# Patient Record
Sex: FEMALE | Race: WHITE | Employment: FULL TIME | URBAN - METROPOLITAN AREA
[De-identification: names, ages, dates, MRNs, and addresses within clinical notes are randomized per-mention and may not be internally consistent; named-entity substitution may affect disease eponyms.]

---

## 2018-08-29 ENCOUNTER — HOSPITAL ENCOUNTER (INPATIENT)
Age: 68
LOS: 5 days | Discharge: HOME | DRG: 355 | End: 2018-09-04
Attending: EMERGENCY MEDICINE | Admitting: FAMILY MEDICINE
Payer: MEDICARE

## 2018-08-29 DIAGNOSIS — K43.0 INCISIONAL VENTRAL HERNIA W OBSTRUCTION: ICD-10-CM

## 2018-08-29 DIAGNOSIS — K56.609 SBO (SMALL BOWEL OBSTRUCTION) (HCC): ICD-10-CM

## 2018-08-29 PROCEDURE — 96361 HYDRATE IV INFUSION ADD-ON: CPT

## 2018-08-29 PROCEDURE — 99284 EMERGENCY DEPT VISIT MOD MDM: CPT

## 2018-08-29 PROCEDURE — 96374 THER/PROPH/DIAG INJ IV PUSH: CPT

## 2018-08-29 PROCEDURE — 96375 TX/PRO/DX INJ NEW DRUG ADDON: CPT

## 2018-08-29 NOTE — IP AVS SNAPSHOT
[address] [facility] 
[phone] Patient: Olivia Vo MRN: [id number] :1950 About your hospitalization You were admitted on:  2018 You last received care in the:  \[facility] [place] [facility]\"" 2 GENERAL SURGERY You were discharged on:  2018 Why you were hospitalized Your primary diagnosis was:  Not on File Your diagnoses also included:  Sbo (Small Bowel Obstruction) (Hcc) Follow-up Information Follow up With Details Comments Contact Info None   None ([phone]) Patient stated that they have no PCP Grecia Escalera MD Go on 2018 For surgical follow up appointment at 1:15PM [address] 
[phone] Your Scheduled Appointments   1:45 PM EDT Any with Grecia Escalera MD  
Critical access hospital [facility] ([address]) [address] [place] [address]  
[phone] Discharge Orders None A check nikki indicates which time of day the medication should be taken. My Medications START taking these medications Instructions Each Dose to Equal  
 Morning Noon Evening Bedtime HYDROcodone-acetaminophen 5-325 mg per tablet Commonly known as:  Bautista Nissa Your last dose was: Your next dose is: Take 1-2 Tabs by mouth every four (4) hours as needed. Max Daily Amount: 12 Tabs. 1-2 Tab CONTINUE taking these medications Instructions Each Dose to Equal  
 Morning Noon Evening Bedtime  
 aspirin 325 mg tablet Commonly known as:  ASPIRIN Your last dose was: Your next dose is: Take 325 mg by mouth daily. 325 mg  
    
   
   
   
  
 metoprolol tartrate 25 mg tablet Commonly known as:  LOPRESSOR Your last dose was: Your next dose is: Take 1 Tab by mouth every twelve (12) hours. 25 mg  
    
   
   
   
  
 pravastatin 40 mg tablet Commonly known as:  PRAVACHOL Your last dose was: Your next dose is: Take 1 Tab by mouth nightly. 40 mg  
    
   
   
   
  
 VITAMIN D2 50,000 unit capsule Generic drug:  ergocalciferol Your last dose was: Your next dose is: Take 50,000 Units by mouth daily. 18191 Units Where to Get Your Medications Information on where to get these meds will be given to you by the nurse or doctor. ! Ask your nurse or doctor about these medications HYDROcodone-acetaminophen 5-325 mg per tablet Opioid Education Prescription Opioids: What You Need to Know: 
 
Prescription opioids can be used to help relieve moderate-to-severe pain and are often prescribed following a surgery or injury, or for certain health conditions. These medications can be an important part of treatment but also come with serious risks. Opioids are strong pain medicines. Examples include hydrocodone, oxycodone, fentanyl, and morphine. Heroin is an example of an illegal opioid. It is important to work with your health care provider to make sure you are getting the safest, most effective care. WHAT ARE THE RISKS AND SIDE EFFECTS OF OPIOID USE? Prescription opioids carry serious risks of addiction and overdose, especially with prolonged use. An opioid overdose, often marked by slow breathing, can cause sudden death. The use of prescription opioids can have a number of side effects as well, even when taken as directed. · Tolerance-meaning you might need to take more of a medication for the same pain relief · Physical dependence-meaning you have symptoms of withdrawal when the medication is stopped.   Withdrawal symptoms can include nausea, sweating, chills, diarrhea, stomach cramps, and muscle aches. Withdrawal can last up to several weeks, depending on which drug you took and how long you took it. · Increased sensitivity to pain · Constipation · Nausea, vomiting, and dry mouth · Sleepiness and dizziness · Confusion · Depression · Low levels of testosterone that can result in lower sex drive, energy, and strength · Itching and sweating RISKS ARE GREATER WITH:      
· History of drug misuse, substance use disorder, or overdose · Mental health conditions (such as depression or anxiety) · Sleep apnea · Older age (72 years or older) · Pregnancy Avoid alcohol while taking prescription opioids. Also, unless specifically advised by your health care provider, medications to avoid include: · Benzodiazepines (such as Xanax or Valium) · Muscle relaxants (such as Soma or Flexeril) · Hypnotics (such as Ambien or Lunesta) · Other prescription opioids KNOW YOUR OPTIONS Talk to your health care provider about ways to manage your pain that don't involve prescription opioids. Some of these options may actually work better and have fewer risks and side effects. Options may include: 
· Pain relievers such as acetaminophen, ibuprofen, and naproxen · Some medications that are also used for depression or seizures · Physical therapy and exercise · Counseling to help patients learn how to cope better with triggers of pain and stress. · Application of heat or cold compress · Massage therapy · Relaxation techniques Be Informed Make sure you know the name of your medication, how much and how often to take it, and its potential risks & side effects. IF YOU ARE PRESCRIBED OPIOIDS FOR PAIN: 
· Never take opioids in greater amounts or more often than prescribed. Remember the goal is not to be pain-free but to manage your pain at a tolerable level. · Follow up with your primary care provider to: · Work together to create a plan on how to manage your pain. · Talk about ways to help manage your pain that don't involve prescription opioids. · Talk about any and all concerns and side effects. · Help prevent misuse and abuse. · Never sell or share prescription opioids · Help prevent misuse and abuse. · Store prescription opioids in a secure place and out of reach of others (this may include visitors, children, friends, and family). · Safely dispose of unused/unwanted prescription opioids: Find your community drug take-back program or your pharmacy mail-back program, or flush them down the toilet, following guidance from the Food and Drug Administration (www.fda.gov/Drugs/ResourcesForYou). · Visit www.cdc.gov/drugoverdose to learn about the risks of opioid abuse and overdose. · If you believe you may be struggling with addiction, tell your health care provider and ask for guidance or call [employer] at 8-683-754-LFNJ. Discharge Instructions Abdominal Hernia Repair: What to Expect at Home Your Recovery After surgery to repair your hernia, you are likely to have pain for a few days. You may also feel like you have the flu, and you may have a low fever and feel tired and nauseated. This is common. You should feel better after a few days and will probably feel much better in 7 days. For several weeks you may feel twinges or pulling in the hernia repair when you move. You may have some bruising around the area of your hernia repair. This is normal. 
This care sheet gives you a general idea about how long it will take for you to recover. But each person recovers at a different pace. Follow the steps below to get better as quickly as possible. How can you care for yourself at home? Activity 
  · Rest when you feel tired. Getting enough sleep will help you recover.   · Try to walk each day. Start by walking a little more than you did the day before. Bit by bit, increase the amount you walk. Walking boosts blood flow and helps prevent pneumonia and constipation.  
  · If your doctor gives you an abdominal binder to wear, use it as directed. This is an elastic bandage that wraps around your belly and upper hips. It helps support your belly muscles after surgery.  
  · Avoid strenuous activities, such as biking, jogging, weight lifting, or aerobic exercise, until your doctor says it is okay.  
  · Avoid lifting anything that would make you strain. This may include heavy grocery bags and milk containers, a heavy briefcase or backpack, cat litter or dog food bags, a vacuum , or a child.  
  · Ask your doctor when you can drive again.  
  · Most people are able to return to work within 1 to 2 weeks after surgery. But if your job requires that you to do heavy lifting or strenuous activity, you may need to take 4 to 6 weeks off from work.  
  · You may shower 24 to 48 hours after surgery, if your doctor okays it. Pat the cut (incision) dry. Do not take a bath for the first 2 weeks, or until your doctor tells you it is okay.  
  · Ask your doctor when it is okay for you to have sex. Diet 
  · You can eat your normal diet. If your stomach is upset, try bland, low-fat foods like plain rice, broiled chicken, toast, and yogurt.  
  · Drink plenty of fluids (unless your doctor tells you not to).  
  · You may notice that your bowel movements are not regular right after your surgery. This is common. Avoid constipation and straining with bowel movements. You may want to take a fiber supplement every day. If you have not had a bowel movement after a couple of days, ask your doctor about taking a mild laxative. Medicines 
  · Your doctor will tell you if and when you can restart your medicines. He or she will also give you instructions about taking any new medicines.   · If you take blood thinners, such as warfarin (Coumadin), clopidogrel (Plavix), or aspirin, be sure to talk to your doctor. He or she will tell you if and when to start taking those medicines again. Make sure that you understand exactly what your doctor wants you to do.  
  · Be safe with medicines. Take pain medicines exactly as directed. ¨ If the doctor gave you a prescription medicine for pain, take it as prescribed. ¨ If you are not taking a prescription pain medicine, ask your doctor if you can take an over-the-counter medicine.  
  · If your doctor prescribed antibiotics, take them as directed. Do not stop taking them just because you feel better. You need to take the full course of antibiotics.  
  · If you think your pain medicine is making you sick to your stomach: 
¨ Take your medicine after meals (unless your doctor has told you not to). ¨ Ask your doctor for a different pain medicine. Incision care 
  · If you have strips of tape on the cut (incision) the doctor made, leave the tape on for a week or until it falls off. Or follow your doctor's instructions for removing the tape.  
  · If you have staples closing the cut, you will need to visit your doctor in 1 to 2 weeks to have them removed.  
  · Wash the area daily with warm, soapy water, and pat it dry. Don't use hydrogen peroxide or alcohol, which can slow healing. You may cover the area with a gauze bandage if it weeps or rubs against clothing. Change the bandage every day. Other instructions 
  · Hold a pillow over your incision when you cough or take deep breaths. This will support your belly and decrease your pain.  
  · Do breathing exercises at home as instructed by your doctor. This will help prevent pneumonia.  
  · If you had laparoscopic surgery, you may also have pain in your left shoulder. The pain usually lasts about a day or two. Follow-up care is a key part of your treatment and safety.  Be sure to make and go to all appointments, and call your doctor if you are having problems. It's also a good idea to know your test results and keep a list of the medicines you take. When should you call for help? Call 911 anytime you think you may need emergency care. For example, call if: 
  · You passed out (lost consciousness).  
  · You are short of breath.  
 Call your doctor now or seek immediate medical care if: 
  · You are sick to your stomach and cannot drink fluids.  
  · You have signs of a blood clot in your leg (called a deep vein thrombosis), such as: 
¨ Pain in your calf, back of the knee, thigh, or groin. ¨ Redness and swelling in your leg or groin.  
  · You have signs of infection, such as: 
¨ Increased pain, swelling, warmth, or redness. ¨ Red streaks leading from the incision. ¨ Pus draining from the incision. ¨ A fever.  
  · You cannot pass stools or gas.  
  · You have pain that does not get better after you take pain medicine.  
  · You have loose stitches, or your incision comes open.  
  · Bright red blood has soaked through the bandage over your incision.  
 Watch closely for changes in your health, and be sure to contact your doctor if you have any problems. Where can you learn more? Go to http://giovanny-cesar.info/. Enter B577 in the search box to learn more about \"Abdominal Hernia Repair: What to Expect at Home. \" Current as of: May 12, 2017 Content Version: 11.7 © 7712-8557 [employer]. Care instructions adapted under license by [employer] (which disclaims liability or warranty for this information). If you have questions about a medical condition or this instruction, always ask your healthcare professional. [place] [address] any warranty or liability for your use of this information. [employer] Announcement  We are excited to announce that we are making your provider's discharge notes available to you in 99 Fahrenheit. You will see these notes when they are completed and signed by the physician that discharged you from your recent hospital stay. If you have any questions or concerns about any information you see in 99 Fahrenheit, please call the Health Information Department where you were seen or reach out to your Primary Care Provider for more information about your plan of care. Introducing [facility]! [facility] introduces 99 Fahrenheit patient portal. Now you can access parts of your medical record, email your doctor's office, and request medication refills online. 1. In your internet browser, go to [email or web address] 2. Click on the First Time User? Click Here link in the Sign In box. You will see the New Member Sign Up page. 3. Enter your 99 Fahrenheit Access Code exactly as it appears below. You will not need to use this code after youve completed the sign-up process. If you do not sign up before the expiration date, you must request a new code. · 99 Fahrenheit Access Code: [email or web address] Expires: 11/28/2018 12:47 AM 
 
4. Enter the last four digits of your Social Security Number (xxxx) and Date of Birth (mm/dd/yyyy) as indicated and click Submit. You will be taken to the next sign-up page. 5. Create a 99 Fahrenheit ID. This will be your 99 Fahrenheit login ID and cannot be changed, so think of one that is secure and easy to remember. 6. Create a 99 Fahrenheit password. You can change your password at any time. 7. Enter your Password Reset Question and Answer. This can be used at a later time if you forget your password. 8. Enter your e-mail address. You will receive e-mail notification when new information is available in [address] 9. Click Sign Up. You can now view and download portions of your medical record. 10. Click the Download Summary menu link to download a portable copy of your medical information. If you have questions, please visit the Frequently Asked Questions section of the MyChart website. Remember, Orderlordt is NOT to be used for urgent needs. For medical emergencies, dial 911. Now available from your iPhone and Android! Introducing Donavan Barber As a [employer] patient, I wanted to make you aware of our electronic visit tool called Donavan Barber. [employer] 24/7 allows you to connect within minutes with a medical provider 24 hours a day, seven days a week via a mobile device or tablet or logging into a secure website from your computer. You can access Donavan Barber from anywhere in the [place]. A virtual visit might be right for you when you have a simple condition and feel like you just dont want to get out of bed, or cant get away from work for an appointment, when your regular [employer] provider is not available (evenings, weekends or holidays), or when youre out of town and need minor care. Electronic visits cost only $49 and if the [employer] 24/7 provider determines a prescription is needed to treat your condition, one can be electronically transmitted to a nearby pharmacy*. Please take a moment to enroll today if you have not already done so. The enrollment process is free and takes just a few minutes. To enroll, please download the [employer] 24/7 milton to your tablet or phone, or visit [email or web address] to enroll on your computer. And, as an [address] [place] [address] patient with a [employer] account, the results of your visits will be scanned into your electronic medical record and your primary care provider will be able to view the scanned results. We urge you to continue to see your regular [employer] provider for your ongoing medical care.   And while your primary care provider may not be the one available when you seek a Donavan Barber virtual visit, the peace of mind you get from getting a real diagnosis real time can be priceless. For more information on Donavan Barber, view our Frequently Asked Questions (FAQs) at [email or web address]. Sincerely, 
 
Latasha Rollins MD 
Chief Medical Officer 50Katarina Chandra *:  certain medications cannot be prescribed via Donavan Barber Providers Seen During Your Hospitalization Provider Specialty Primary office phone Claudia Mercedes MD Emergency Medicine [phone] Nat Rust, [address] [place] [address] General Surgery [phone] Your Primary Care Physician (PCP) Primary Care Physician Office Phone Office Fax NONE ** None ** ** None ** You are allergic to the following No active allergies Recent Documentation Height Weight BMI OB Status Smoking Status 1.676 m 107.1 kg 38.11 kg/m2 Postmenopausal Former Smoker Emergency Contacts Name Discharge Info Relation Home Work Mobile [address] CAREGIVER [3] Friend [5] [phone] Patient Belongings The following personal items are in your possession at time of discharge: 
  Dental Appliances: None  Visual Aid: None      Home Medications: None   Jewelry: None  Clothing: At bedside    Other Valuables: At bedside Please provide this summary of care documentation to your next provider. Signatures-by signing, you are acknowledging that this After Visit Summary has been reviewed with you and you have received a copy. Patient Signature:  ____________________________________________________________ Date:  ____________________________________________________________  
  
Alejandra Avila Provider Signature:  ____________________________________________________________ Date:  ____________________________________________________________

## 2018-08-29 NOTE — IP AVS SNAPSHOT
[address] [place] [address] 
[phone] Patient: nAn Mendieta MRN: [id number] :1950 A check nikki indicates which time of day the medication should be taken. My Medications START taking these medications Instructions Each Dose to Equal  
 Morning Noon Evening Bedtime HYDROcodone-acetaminophen 5-325 mg per tablet Commonly known as:  Thelbert Alta Your last dose was: Your next dose is: Take 1-2 Tabs by mouth every four (4) hours as needed. Max Daily Amount: 12 Tabs. 1-2 Tab CONTINUE taking these medications Instructions Each Dose to Equal  
 Morning Noon Evening Bedtime  
 aspirin 325 mg tablet Commonly known as:  ASPIRIN Your last dose was: Your next dose is: Take 325 mg by mouth daily. 325 mg  
    
   
   
   
  
 metoprolol tartrate 25 mg tablet Commonly known as:  LOPRESSOR Your last dose was: Your next dose is: Take 1 Tab by mouth every twelve (12) hours. 25 mg  
    
   
   
   
  
 pravastatin 40 mg tablet Commonly known as:  PRAVACHOL Your last dose was: Your next dose is: Take 1 Tab by mouth nightly. 40 mg  
    
   
   
   
  
 VITAMIN D2 50,000 unit capsule Generic drug:  ergocalciferol Your last dose was: Your next dose is: Take 50,000 Units by mouth daily. 74895 Units Where to Get Your Medications Information on where to get these meds will be given to you by the nurse or doctor. ! Ask your nurse or doctor about these medications HYDROcodone-acetaminophen 5-325 mg per tablet

## 2018-08-30 ENCOUNTER — APPOINTMENT (OUTPATIENT)
Dept: CT IMAGING | Age: 68
DRG: 355 | End: 2018-08-30
Attending: EMERGENCY MEDICINE
Payer: MEDICARE

## 2018-08-30 ENCOUNTER — APPOINTMENT (OUTPATIENT)
Dept: GENERAL RADIOLOGY | Age: 68
DRG: 355 | End: 2018-08-30
Attending: EMERGENCY MEDICINE
Payer: MEDICARE

## 2018-08-30 PROBLEM — K56.609 SBO (SMALL BOWEL OBSTRUCTION) (HCC): Status: ACTIVE | Noted: 2018-08-30

## 2018-08-30 LAB
ALBUMIN SERPL-MCNC: 3.3 G/DL (ref 3.5–5)
ALBUMIN SERPL-MCNC: 3.6 G/DL (ref 3.5–5)
ALBUMIN/GLOB SERPL: 0.8 {RATIO} (ref 1.1–2.2)
ALBUMIN/GLOB SERPL: 0.8 {RATIO} (ref 1.1–2.2)
ALP SERPL-CCNC: 65 U/L (ref 45–117)
ALP SERPL-CCNC: 72 U/L (ref 45–117)
ALT SERPL-CCNC: 36 U/L (ref 12–78)
ALT SERPL-CCNC: 41 U/L (ref 12–78)
ANION GAP SERPL CALC-SCNC: 12 MMOL/L (ref 5–15)
ANION GAP SERPL CALC-SCNC: 8 MMOL/L (ref 5–15)
APPEARANCE UR: CLEAR
AST SERPL-CCNC: 20 U/L (ref 15–37)
AST SERPL-CCNC: 25 U/L (ref 15–37)
ATRIAL RATE: 100 BPM
BACTERIA URNS QL MICRO: NEGATIVE /HPF
BASOPHILS # BLD: 0 K/UL (ref 0–0.1)
BASOPHILS NFR BLD: 0 % (ref 0–1)
BILIRUB SERPL-MCNC: 1.1 MG/DL (ref 0.2–1)
BILIRUB SERPL-MCNC: 1.2 MG/DL (ref 0.2–1)
BILIRUB UR QL CFM: NEGATIVE
BUN SERPL-MCNC: 14 MG/DL (ref 6–20)
BUN SERPL-MCNC: 23 MG/DL (ref 6–20)
BUN/CREAT SERPL: 16 (ref 12–20)
BUN/CREAT SERPL: 23 (ref 12–20)
CALCIUM SERPL-MCNC: 8.5 MG/DL (ref 8.5–10.1)
CALCIUM SERPL-MCNC: 9.2 MG/DL (ref 8.5–10.1)
CALCULATED P AXIS, ECG09: 57 DEGREES
CALCULATED R AXIS, ECG10: 32 DEGREES
CALCULATED T AXIS, ECG11: 32 DEGREES
CHLORIDE SERPL-SCNC: 103 MMOL/L (ref 97–108)
CHLORIDE SERPL-SCNC: 104 MMOL/L (ref 97–108)
CO2 SERPL-SCNC: 24 MMOL/L (ref 21–32)
CO2 SERPL-SCNC: 27 MMOL/L (ref 21–32)
COLOR UR: ABNORMAL
CREAT SERPL-MCNC: 0.88 MG/DL (ref 0.55–1.02)
CREAT SERPL-MCNC: 0.98 MG/DL (ref 0.55–1.02)
DIAGNOSIS, 93000: NORMAL
DIFFERENTIAL METHOD BLD: ABNORMAL
EOSINOPHIL # BLD: 0 K/UL (ref 0–0.4)
EOSINOPHIL NFR BLD: 0 % (ref 0–7)
EPITH CASTS URNS QL MICRO: ABNORMAL /LPF
ERYTHROCYTE [DISTWIDTH] IN BLOOD BY AUTOMATED COUNT: 13 % (ref 11.5–14.5)
ERYTHROCYTE [DISTWIDTH] IN BLOOD BY AUTOMATED COUNT: 13.2 % (ref 11.5–14.5)
GLOBULIN SER CALC-MCNC: 3.9 G/DL (ref 2–4)
GLOBULIN SER CALC-MCNC: 4.3 G/DL (ref 2–4)
GLUCOSE SERPL-MCNC: 110 MG/DL (ref 65–100)
GLUCOSE SERPL-MCNC: 124 MG/DL (ref 65–100)
GLUCOSE UR STRIP.AUTO-MCNC: NEGATIVE MG/DL
HCT VFR BLD AUTO: 40.1 % (ref 35–47)
HCT VFR BLD AUTO: 42.1 % (ref 35–47)
HGB BLD-MCNC: 13.5 G/DL (ref 11.5–16)
HGB BLD-MCNC: 14.2 G/DL (ref 11.5–16)
HGB UR QL STRIP: ABNORMAL
IMM GRANULOCYTES # BLD: 0 K/UL (ref 0–0.04)
IMM GRANULOCYTES NFR BLD AUTO: 0 % (ref 0–0.5)
KETONES UR QL STRIP.AUTO: 80 MG/DL
LACTATE SERPL-SCNC: 1.1 MMOL/L (ref 0.4–2)
LEUKOCYTE ESTERASE UR QL STRIP.AUTO: ABNORMAL
LIPASE SERPL-CCNC: 127 U/L (ref 73–393)
LYMPHOCYTES # BLD: 1.1 K/UL (ref 0.8–3.5)
LYMPHOCYTES NFR BLD: 13 % (ref 12–49)
MCH RBC QN AUTO: 29.4 PG (ref 26–34)
MCH RBC QN AUTO: 29.4 PG (ref 26–34)
MCHC RBC AUTO-ENTMCNC: 33.7 G/DL (ref 30–36.5)
MCHC RBC AUTO-ENTMCNC: 33.7 G/DL (ref 30–36.5)
MCV RBC AUTO: 87.2 FL (ref 80–99)
MCV RBC AUTO: 87.4 FL (ref 80–99)
MONOCYTES # BLD: 0.4 K/UL (ref 0–1)
MONOCYTES NFR BLD: 5 % (ref 5–13)
NEUTS SEG # BLD: 6.8 K/UL (ref 1.8–8)
NEUTS SEG NFR BLD: 82 % (ref 32–75)
NITRITE UR QL STRIP.AUTO: NEGATIVE
NRBC # BLD: 0 K/UL (ref 0–0.01)
NRBC # BLD: 0 K/UL (ref 0–0.01)
NRBC BLD-RTO: 0 PER 100 WBC
NRBC BLD-RTO: 0 PER 100 WBC
P-R INTERVAL, ECG05: 144 MS
PH UR STRIP: 5.5 [PH] (ref 5–8)
PLATELET # BLD AUTO: 214 K/UL (ref 150–400)
PLATELET # BLD AUTO: 243 K/UL (ref 150–400)
PMV BLD AUTO: 9.3 FL (ref 8.9–12.9)
PMV BLD AUTO: 9.7 FL (ref 8.9–12.9)
POTASSIUM SERPL-SCNC: 3.4 MMOL/L (ref 3.5–5.1)
POTASSIUM SERPL-SCNC: 3.5 MMOL/L (ref 3.5–5.1)
PROT SERPL-MCNC: 7.2 G/DL (ref 6.4–8.2)
PROT SERPL-MCNC: 7.9 G/DL (ref 6.4–8.2)
PROT UR STRIP-MCNC: 30 MG/DL
Q-T INTERVAL, ECG07: 356 MS
QRS DURATION, ECG06: 84 MS
QTC CALCULATION (BEZET), ECG08: 459 MS
RBC # BLD AUTO: 4.59 M/UL (ref 3.8–5.2)
RBC # BLD AUTO: 4.83 M/UL (ref 3.8–5.2)
RBC #/AREA URNS HPF: ABNORMAL /HPF (ref 0–5)
SODIUM SERPL-SCNC: 138 MMOL/L (ref 136–145)
SODIUM SERPL-SCNC: 140 MMOL/L (ref 136–145)
SP GR UR REFRACTOMETRY: >1.03 (ref 1–1.03)
UA: UC IF INDICATED,UAUC: ABNORMAL
UROBILINOGEN UR QL STRIP.AUTO: 1 EU/DL (ref 0.2–1)
VENTRICULAR RATE, ECG03: 100 BPM
WBC # BLD AUTO: 7.3 K/UL (ref 3.6–11)
WBC # BLD AUTO: 8.3 K/UL (ref 3.6–11)
WBC URNS QL MICRO: ABNORMAL /HPF (ref 0–4)

## 2018-08-30 PROCEDURE — 74011636320 HC RX REV CODE- 636/320: Performed by: EMERGENCY MEDICINE

## 2018-08-30 PROCEDURE — 65660000000 HC RM CCU STEPDOWN

## 2018-08-30 PROCEDURE — 96374 THER/PROPH/DIAG INJ IV PUSH: CPT

## 2018-08-30 PROCEDURE — 81001 URINALYSIS AUTO W/SCOPE: CPT | Performed by: EMERGENCY MEDICINE

## 2018-08-30 PROCEDURE — 36415 COLL VENOUS BLD VENIPUNCTURE: CPT | Performed by: FAMILY MEDICINE

## 2018-08-30 PROCEDURE — 74011000250 HC RX REV CODE- 250: Performed by: INTERNAL MEDICINE

## 2018-08-30 PROCEDURE — 80053 COMPREHEN METABOLIC PANEL: CPT | Performed by: EMERGENCY MEDICINE

## 2018-08-30 PROCEDURE — 74177 CT ABD & PELVIS W/CONTRAST: CPT

## 2018-08-30 PROCEDURE — 87086 URINE CULTURE/COLONY COUNT: CPT | Performed by: EMERGENCY MEDICINE

## 2018-08-30 PROCEDURE — 96361 HYDRATE IV INFUSION ADD-ON: CPT

## 2018-08-30 PROCEDURE — 93005 ELECTROCARDIOGRAM TRACING: CPT

## 2018-08-30 PROCEDURE — 80053 COMPREHEN METABOLIC PANEL: CPT | Performed by: FAMILY MEDICINE

## 2018-08-30 PROCEDURE — 96375 TX/PRO/DX INJ NEW DRUG ADDON: CPT

## 2018-08-30 PROCEDURE — 83690 ASSAY OF LIPASE: CPT | Performed by: EMERGENCY MEDICINE

## 2018-08-30 PROCEDURE — 74018 RADEX ABDOMEN 1 VIEW: CPT

## 2018-08-30 PROCEDURE — 83605 ASSAY OF LACTIC ACID: CPT | Performed by: EMERGENCY MEDICINE

## 2018-08-30 PROCEDURE — 74011250636 HC RX REV CODE- 250/636: Performed by: EMERGENCY MEDICINE

## 2018-08-30 PROCEDURE — 85025 COMPLETE CBC W/AUTO DIFF WBC: CPT | Performed by: EMERGENCY MEDICINE

## 2018-08-30 PROCEDURE — 85027 COMPLETE CBC AUTOMATED: CPT | Performed by: FAMILY MEDICINE

## 2018-08-30 PROCEDURE — 74011250636 HC RX REV CODE- 250/636: Performed by: FAMILY MEDICINE

## 2018-08-30 RX ORDER — LORAZEPAM 2 MG/ML
1 INJECTION INTRAMUSCULAR
Status: DISCONTINUED | OUTPATIENT
Start: 2018-08-30 | End: 2018-09-04 | Stop reason: HOSPADM

## 2018-08-30 RX ORDER — HYDRALAZINE HYDROCHLORIDE 20 MG/ML
10 INJECTION INTRAMUSCULAR; INTRAVENOUS
Status: DISCONTINUED | OUTPATIENT
Start: 2018-08-30 | End: 2018-08-30

## 2018-08-30 RX ORDER — SODIUM CHLORIDE 0.9 % (FLUSH) 0.9 %
10 SYRINGE (ML) INJECTION
Status: COMPLETED | OUTPATIENT
Start: 2018-08-30 | End: 2018-08-30

## 2018-08-30 RX ORDER — ASPIRIN 325 MG
325 TABLET ORAL DAILY
COMMUNITY

## 2018-08-30 RX ORDER — ONDANSETRON 2 MG/ML
4 INJECTION INTRAMUSCULAR; INTRAVENOUS
Status: DISCONTINUED | OUTPATIENT
Start: 2018-08-30 | End: 2018-09-04 | Stop reason: HOSPADM

## 2018-08-30 RX ORDER — ERGOCALCIFEROL 1.25 MG/1
50000 CAPSULE ORAL DAILY
COMMUNITY

## 2018-08-30 RX ORDER — SODIUM CHLORIDE 9 MG/ML
50 INJECTION, SOLUTION INTRAVENOUS
Status: COMPLETED | OUTPATIENT
Start: 2018-08-30 | End: 2018-08-30

## 2018-08-30 RX ORDER — METOPROLOL TARTRATE 5 MG/5ML
5 INJECTION INTRAVENOUS EVERY 6 HOURS
Status: DISCONTINUED | OUTPATIENT
Start: 2018-08-30 | End: 2018-09-01

## 2018-08-30 RX ORDER — ONDANSETRON 2 MG/ML
4 INJECTION INTRAMUSCULAR; INTRAVENOUS
Status: COMPLETED | OUTPATIENT
Start: 2018-08-30 | End: 2018-08-30

## 2018-08-30 RX ORDER — DEXTROSE, SODIUM CHLORIDE, AND POTASSIUM CHLORIDE 5; .45; .075 G/100ML; G/100ML; G/100ML
50 INJECTION INTRAVENOUS CONTINUOUS
Status: DISPENSED | OUTPATIENT
Start: 2018-08-30 | End: 2018-09-03

## 2018-08-30 RX ORDER — SODIUM CHLORIDE 0.9 % (FLUSH) 0.9 %
5-10 SYRINGE (ML) INJECTION EVERY 8 HOURS
Status: DISCONTINUED | OUTPATIENT
Start: 2018-08-30 | End: 2018-09-04 | Stop reason: HOSPADM

## 2018-08-30 RX ORDER — SODIUM CHLORIDE 0.9 % (FLUSH) 0.9 %
5-10 SYRINGE (ML) INJECTION AS NEEDED
Status: DISCONTINUED | OUTPATIENT
Start: 2018-08-30 | End: 2018-09-04 | Stop reason: HOSPADM

## 2018-08-30 RX ORDER — FENTANYL CITRATE 50 UG/ML
50 INJECTION, SOLUTION INTRAMUSCULAR; INTRAVENOUS
Status: COMPLETED | OUTPATIENT
Start: 2018-08-30 | End: 2018-08-30

## 2018-08-30 RX ORDER — HYDROMORPHONE HYDROCHLORIDE 2 MG/ML
1 INJECTION, SOLUTION INTRAMUSCULAR; INTRAVENOUS; SUBCUTANEOUS
Status: DISCONTINUED | OUTPATIENT
Start: 2018-08-30 | End: 2018-09-04 | Stop reason: HOSPADM

## 2018-08-30 RX ADMIN — IOPAMIDOL 100 ML: 755 INJECTION, SOLUTION INTRAVENOUS at 00:57

## 2018-08-30 RX ADMIN — FENTANYL CITRATE 50 MCG: 50 INJECTION, SOLUTION INTRAMUSCULAR; INTRAVENOUS at 02:03

## 2018-08-30 RX ADMIN — DEXTROSE MONOHYDRATE, SODIUM CHLORIDE, AND POTASSIUM CHLORIDE 100 ML/HR: 50; 4.5; .745 INJECTION, SOLUTION INTRAVENOUS at 14:39

## 2018-08-30 RX ADMIN — LORAZEPAM 1 MG: 2 INJECTION INTRAMUSCULAR; INTRAVENOUS at 22:00

## 2018-08-30 RX ADMIN — LORAZEPAM 1 MG: 2 INJECTION INTRAMUSCULAR; INTRAVENOUS at 04:21

## 2018-08-30 RX ADMIN — Medication 10 ML: at 21:52

## 2018-08-30 RX ADMIN — HYDROMORPHONE HYDROCHLORIDE 1 MG: 2 INJECTION, SOLUTION INTRAMUSCULAR; INTRAVENOUS; SUBCUTANEOUS at 21:59

## 2018-08-30 RX ADMIN — DEXTROSE MONOHYDRATE, SODIUM CHLORIDE, AND POTASSIUM CHLORIDE 100 ML/HR: 50; 4.5; .745 INJECTION, SOLUTION INTRAVENOUS at 04:49

## 2018-08-30 RX ADMIN — ONDANSETRON 4 MG: 2 INJECTION, SOLUTION INTRAMUSCULAR; INTRAVENOUS at 02:03

## 2018-08-30 RX ADMIN — SODIUM CHLORIDE 50 ML/HR: 900 INJECTION, SOLUTION INTRAVENOUS at 01:42

## 2018-08-30 RX ADMIN — Medication 10 ML: at 14:39

## 2018-08-30 RX ADMIN — Medication 10 ML: at 01:42

## 2018-08-30 RX ADMIN — Medication 10 ML: at 05:53

## 2018-08-30 RX ADMIN — METOPROLOL TARTRATE 5 MG: 5 INJECTION, SOLUTION INTRAVENOUS at 17:26

## 2018-08-30 RX ADMIN — METOPROLOL TARTRATE 5 MG: 5 INJECTION, SOLUTION INTRAVENOUS at 11:53

## 2018-08-30 NOTE — ED TRIAGE NOTES
TRANSFER - OUT REPORT: 
 
Verbal report given to RAFFY Nunez(name) on [address]  being transferred to [facility](unit) for routine progression of care Report consisted of patients Situation, Background, Assessment and  
Recommendations(SBAR). Information from the following report(s) SBAR, Kardex, ED Summary, [place] and Recent Results was reviewed with the receiving nurse. Lines:  
Peripheral IV 08/30/18 Left Wrist (Active) Site Assessment Clean, dry, & intact 8/30/2018 12:41 AM  
Phlebitis Assessment 0 8/30/2018 12:41 AM  
Infiltration Assessment 0 8/30/2018 12:41 AM  
Dressing Status Clean, dry, & intact 8/30/2018 12:41 AM  
Dressing Type Tape;Transparent 8/30/2018 12:41 AM  
Hub Color/Line Status Pink;Flushed 8/30/2018 12:41 AM  
Action Taken Blood drawn 8/30/2018 12:41 AM  
  
 
Opportunity for questions and clarification was provided. Patient transported with: 
 Registered Nurse

## 2018-08-30 NOTE — PROGRESS NOTES
Pt admitted earlier this morning by my partner. She is seen on rounds today as courtesy. Pt currently without complaints other discomfort from NG tube. Says that her abdomen is softer and not as distended as when she came in. IMP/PLAN: 
Hypertension, benign/essential:  
- start IV metoprolol (on oral lopressor at home) - prn nitrobid Hyperlipidemia: con't holding statin while NPO  
SBO due to ventral wall hernia:  Thinks she developed this from chopping and carrying wood - CT A/P with small bowel obstruction due to abdominal wall hernia 
- see initial consult regarding cardiac clearance 
- con't NG tube  
- further mgmt per primary team 
Obesity  
  
Code Status: Full DVT Prophylaxis: SCDs No charge.

## 2018-08-30 NOTE — H&P
Surgery History and Physical 
 
Subjective:  
  
Philip Robert is a 74 y.o. female who presents for evaluation of lump of abd, pain and vomiting. Patioent as a venral hernia which has been present and increasing in size for \"months\". May be related to episode when she was cutting down trees. Hernia became symptomatic on Sat when it was bigger and patient had episodes of vomitng. Was able to take liquids but all solids she vomited. Loose sttols until 3 days ago. NO bM or flatus since then CT scan: \"There is mid small bowel obstruction due to supraumbilical abdominal wall Hernia. WBC, lactic acid wnl Past Medical History:  
Diagnosis Date  Arthritis  Hypertension \"Supposed to be taking medications for HTN but has not\"  Ill-defined condition Hyperlipidemia Past Surgical History:  
Procedure Laterality Date  HX CHOLECYSTECTOMY  HX HEENT    
 ffx nose repair 1970's Family History Problem Relation Age of Onset  Diabetes Mother Social History Substance Use Topics  Smoking status: Former Smoker  Smokeless tobacco: Not on file Comment: quit in 2001  Alcohol use Yes Comment: not every day. Prior to Admission medications Medication Sig Start Date End Date Taking? Authorizing Provider  
aspirin (ASPIRIN) 325 mg tablet Take 325 mg by mouth daily. Yes Raimundo Evans MD  
ergocalciferol (VITAMIN D2) 50,000 unit capsule Take 50,000 Units by mouth daily. Yes Raimundo Evans MD  
metoprolol (LOPRESSOR) 25 mg tablet Take 1 Tab by mouth every twelve (12) hours. 9/3/11  Yes Renan Beard MD  
pravastatin (PRAVACHOL) 40 mg tablet Take 1 Tab by mouth nightly. 9/3/11  Yes Renan Beard MD  
  
No Known Allergies Review of Systemssee \[facility]\""/PMH Objective:  
 
Patient Vitals for the past 8 hrs: 
 BP Temp Pulse Resp SpO2 Height Weight 08/30/18 0154 - - - - 99 % - -  
08/30/18 0153 164/90 - - - - - -  
08/30/18 0100 (!) 143/106 - - - 96 % - -  
 18 0055 (!) 122/107 - - - - - -  
18 2240 (!) 177/110 98.9 °F (37.2 °C) 98 16 98 % 5' 6\" (1.676 m) 236 lb 1.8 oz (107.1 kg) Temp (24hrs), Av.9 °F (37.2 °C), Min:98.9 °F (37.2 °C), Max:98.9 °F (37.2 °C) Physical Exam Constitutional: She is oriented to person, place, and time. She appears well-developed and well-nourished. NAD 
NG in place, not to suction yet Head: Atraumatic. Eyes: EOM are normal.  
Cardiovascular: Normal rate, regular rhythm, 
Pulmonary/Chest: Effort normal  
Abdominal: Soft. Bowel sounds are normal. There is no rebound and no guarding. Hernia is initially soft and non tender, partially but not completely reduced, fluid moving out of hernia Musculoskeletal: grossly wnl Neurological: She is oriented to person, place, and time. Skin: Skin is warm. Assessment: SBO related to ventral hernia Plan:  
Admit NPO/NG Serial labs, imaging, exams. Hopefully pt responds to ng DECOMPRESSION ALLOWING HERNIA TO BE FURTHER REDUCED RESOLVING sbo. 
 
iF NOT IMPROVING MAY REQUIRE HERNIA SURGERY Hospitalist 'Consult for HTN , preop eval. 
 
Signed By: Donnell Holter, MD  
[facility] 2018

## 2018-08-30 NOTE — PROGRESS NOTES
7am-7pm 
 
Patient resting quietly watching television Patient started on metoprolol today and cardiac monitoring Ordered Chloraseptic for throat Dr. Rolf Lepe said it's okay for her to have ice chips Patient is now passing flatus but still no BM Tearful because she wants the NG out Dr. Marbella Peters said patient could be removed from NG suction to ambulate.

## 2018-08-30 NOTE — ED PROVIDER NOTES
EMERGENCY DEPARTMENT HISTORY AND PHYSICAL EXAM 
 
 
Date: 8/29/2018 Patient Name: Olivia Vo History of Presenting Illness Chief Complaint Patient presents with  Abdominal Pain Pt ambulatory to triage with c/o \"a huge lump to my stomach\"; pt believes she has hernia; pt states \"only small bowel movements since Saturday. I threw up a tiny bit today\"  Vomiting  
  x Saturday History Provided By: Patient HPI: Olivia Vo, 74 y.o. female with PMHx significant for arthritis, presents ambulatory to the ED with cc of intermittent abd pain x a few months that has been constant x 5 days. She reports pain is associated with her R sided hernia. She states she has never been evaluated for her hernia. Pt reports exacerbation in pain 5 days ago after eating dinner, stating she \"felt like the food was stuck. \" She reports associated episodes of NV 4 days ago and 3 episodes of diarrhea today. She also notes decreased appetite. She reports hx of cholecystectomy. Pt specifically denies any fever, chills, CP or SOB. There are no other complaints, changes, or physical findings at this time. PCP: None Current Facility-Administered Medications Medication Dose Route Frequency Provider Last Rate Last Dose  sodium chloride 0.9 % bolus infusion 1,000 mL  1,000 mL IntraVENous ONCE Tatyana Ingram MD      
 
Current Outpatient Prescriptions Medication Sig Dispense Refill  aspirin (ASPIRIN) 325 mg tablet Take 325 mg by mouth daily.  ergocalciferol (VITAMIN D2) 50,000 unit capsule Take 50,000 Units by mouth daily.  metoprolol (LOPRESSOR) 25 mg tablet Take 1 Tab by mouth every twelve (12) hours. 30 Tab 0  pravastatin (PRAVACHOL) 40 mg tablet Take 1 Tab by mouth nightly. 30 Tab 0 Past History Past Medical History: 
Past Medical History:  
Diagnosis Date  Arthritis  Hypertension \"Supposed to be taking medications for HTN but has not\"  Ill-defined condition Hyperlipidemia Past Surgical History: 
Past Surgical History:  
Procedure Laterality Date  HX CHOLECYSTECTOMY  HX HEENT    
 ffx nose repair 1970's Family History: 
Family History Problem Relation Age of Onset  Diabetes Mother Social History: 
Social History Substance Use Topics  Smoking status: Former Smoker  Smokeless tobacco: None Comment: quit in 2001  Alcohol use Yes Comment: not every day. Allergies: 
No Known Allergies Review of Systems Review of Systems Constitutional: Positive for appetite change (decreased). Negative for activity change, fatigue and fever. HENT: Negative. Negative for congestion, rhinorrhea and sore throat. Respiratory: Negative. Negative for cough, shortness of breath and wheezing. Cardiovascular: Negative. Negative for chest pain and leg swelling. Gastrointestinal: Positive for abdominal pain, diarrhea, nausea (resolved) and vomiting (resolved). Negative for abdominal distention and constipation. Endocrine: Negative. Genitourinary: Negative for difficulty urinating, dysuria, menstrual problem, vaginal bleeding and vaginal discharge. Musculoskeletal: Negative. Negative for arthralgias, joint swelling and myalgias. Skin: Negative. Negative for rash. Neurological: Negative. Negative for dizziness, weakness, light-headedness and headaches. Psychiatric/Behavioral: Negative. Physical Exam  
Physical Exam  
Constitutional: She is oriented to person, place, and time. She appears well-developed and well-nourished. HENT:  
Head: Atraumatic. Eyes: EOM are normal.  
Cardiovascular: Normal rate, regular rhythm, normal heart sounds and intact distal pulses. Exam reveals no gallop and no friction rub. No murmur heard. Pulmonary/Chest: Effort normal and breath sounds normal. No respiratory distress. She has no wheezes. She has no rales. She exhibits no tenderness. Abdominal: Soft. Bowel sounds are normal. There is no rebound and no guarding. Softball sized protuberance R mid-lateral abd, consistent with hernia, fairly firm, mild tenderness, not able to reduce. Musculoskeletal: Normal range of motion. She exhibits no edema or tenderness. Neurological: She is oriented to person, place, and time. Skin: Skin is warm. Psychiatric: She has a normal mood and affect. Nursing note and vitals reviewed. Diagnostic Study Results Labs - Recent Results (from the past 12 hour(s)) CBC WITH AUTOMATED DIFF Collection Time: 08/30/18 12:36 AM  
Result Value Ref Range WBC 8.3 3.6 - 11.0 K/uL  
 RBC 4.83 3.80 - 5.20 M/uL  
 HGB 14.2 11.5 - 16.0 g/dL HCT 42.1 35.0 - 47.0 % MCV 87.2 80.0 - 99.0 FL  
 MCH 29.4 26.0 - 34.0 PG  
 MCHC 33.7 30.0 - 36.5 g/dL  
 RDW 13.0 11.5 - 14.5 % PLATELET 918 738 - 888 K/uL MPV 9.7 8.9 - 12.9 FL  
 NRBC 0.0 0  WBC ABSOLUTE NRBC 0.00 0.00 - 0.01 K/uL NEUTROPHILS 82 (H) 32 - 75 % LYMPHOCYTES 13 12 - 49 % MONOCYTES 5 5 - 13 % EOSINOPHILS 0 0 - 7 % BASOPHILS 0 0 - 1 % IMMATURE GRANULOCYTES 0 0.0 - 0.5 % ABS. NEUTROPHILS 6.8 1.8 - 8.0 K/UL  
 ABS. LYMPHOCYTES 1.1 0.8 - 3.5 K/UL  
 ABS. MONOCYTES 0.4 0.0 - 1.0 K/UL  
 ABS. EOSINOPHILS 0.0 0.0 - 0.4 K/UL  
 ABS. BASOPHILS 0.0 0.0 - 0.1 K/UL  
 ABS. IMM. GRANS. 0.0 0.00 - 0.04 K/UL  
 DF AUTOMATED METABOLIC PANEL, COMPREHENSIVE Collection Time: 08/30/18 12:36 AM  
Result Value Ref Range Sodium 140 136 - 145 mmol/L Potassium 3.5 3.5 - 5.1 mmol/L Chloride 104 97 - 108 mmol/L  
 CO2 24 21 - 32 mmol/L Anion gap 12 5 - 15 mmol/L Glucose 110 (H) 65 - 100 mg/dL BUN 23 (H) 6 - 20 MG/DL Creatinine 0.98 0.55 - 1.02 MG/DL  
 BUN/Creatinine ratio 23 (H) 12 - 20 GFR est AA >60 >60 ml/min/1.73m2 GFR est non-AA 57 (L) >60 ml/min/1.73m2 Calcium 9.2 8.5 - 10.1 MG/DL  Bilirubin, total 1.1 (H) 0.2 - 1.0 MG/DL  
 ALT (SGPT) 41 12 - 78 U/L  
 AST (SGOT) 25 15 - 37 U/L Alk. phosphatase 72 45 - 117 U/L Protein, total 7.9 6.4 - 8.2 g/dL Albumin 3.6 3.5 - 5.0 g/dL Globulin 4.3 (H) 2.0 - 4.0 g/dL A-G Ratio 0.8 (L) 1.1 - 2.2 LIPASE Collection Time: 08/30/18 12:36 AM  
Result Value Ref Range Lipase 127 73 - 393 U/L  
LACTIC ACID Collection Time: 08/30/18 12:36 AM  
Result Value Ref Range Lactic acid 1.1 0.4 - 2.0 MMOL/L  
URINALYSIS W/ REFLEX CULTURE Collection Time: 08/30/18  2:03 AM  
Result Value Ref Range Color YELLOW/STRAW Appearance CLEAR CLEAR Specific gravity >1.030 (H) 1.003 - 1.030  
 pH (UA) 5.5 5.0 - 8.0 Protein 30 (A) NEG mg/dL Glucose NEGATIVE  NEG mg/dL Ketone 80 (A) NEG mg/dL Blood TRACE (A) NEG Urobilinogen 1.0 0.2 - 1.0 EU/dL Nitrites NEGATIVE  NEG Leukocyte Esterase SMALL (A) NEG    
 WBC 10-20 0 - 4 /hpf  
 RBC 0-5 0 - 5 /hpf Epithelial cells FEW FEW /lpf Bacteria NEGATIVE  NEG /hpf  
 UA:UC IF INDICATED URINE CULTURE ORDERED (A) CNI    
BILIRUBIN, CONFIRM Collection Time: 08/30/18  2:03 AM  
Result Value Ref Range Bilirubin UA, confirm NEGATIVE  NEG Radiologic Studies -  
CT ABD PELV W CONT Final Result XR ABD (KUB)    (Results Pending) CT Results  (Last 48 hours) 08/30/18 0144  CT ABD PELV W CONT Final result Impression:  IMPRESSION: Small bowel obstruction due to abdominal wall hernia. Narrative:  INDICATION: hernia EXAM: CT Abdomen and Pelvis is performed with 100 mL Isovue 370 contrast IV  
without complication. CT dose reduction was achieved through use of a  
standardized protocol tailored for this examination and automatic exposure  
control for dose modulation. FINDINGS:   
   
There is mid small bowel obstruction due to supraumbilical abdominal wall  
hernia. There is no ascites, pneumoperitoneum or significant adenopathy. Liver is fatty. Bile ducts are not enlarged. Pancreas shows no mass or inflammation. Spleen is  
unremarkable. Adrenal glands are normal in size. Kidneys show no mass or  
hydronephrosis. Aorta is without aneurysm. The appendix is normal. The bladder is not distended. The distal ureters are not  
dilated. There is no apparent pelvic mass. CXR Results  (Last 48 hours) None Medical Decision Making I am the first provider for this patient. I reviewed the vital signs, available nursing notes, past medical history, past surgical history, family history and social history. Vital Signs-Reviewed the patient's vital signs. Patient Vitals for the past 12 hrs: 
 Temp Pulse Resp BP SpO2  
08/30/18 0154 - - - - 99 % 08/30/18 0153 - - - 164/90 -  
08/30/18 0100 - - - (!) 143/106 96 % 08/30/18 0055 - - - (!) 122/107 -  
08/29/18 2240 98.9 °F (37.2 °C) 98 16 (!) 177/110 98 % Pulse Oximetry Analysis - 100% on RA Cardiac Monitor:  
Rate: 98 bpm 
 
Records Reviewed: Nursing Notes, Old Medical Records, Previous Radiology Studies and Previous Laboratory Studies Provider Notes (Medical Decision Making):  
Likely incision from remote, open cholecystectomy with concern for possible incarceration. Will check labs, CT, attempt to reduce after pain controlled. ED Course:  
Initial assessment performed. The patients presenting problems have been discussed, and they are in agreement with the care plan formulated and outlined with them. I have encouraged them to ask questions as they arise throughout their visit. 2:30 AM 
Pt reevaluated and updated on CT findings. Pt in agreement with care plan. Written by PARKER Aguilar, as dictated by Yue Cha MD. 
 
Consult Note: 
2:51 AM 
Yue Cha MD spoke with Dr. Renan Velez Specialty: General Surgery Discussed pts hx, disposition, and available diagnostic and imaging results. Reviewed care plans.   Will come evaluate pt.  
 
3:32 AM 
 Dr. Jairo Brian at bedside. Written by Char Duque, ED Scribe, as dictated by Braden Lobo MD. Critical Care Time:  
none Disposition: 
3:44 AM 
Patient is being admitted to general surgery. The results of their tests and reasons for their admission have been discussed with them and/or available family. They convey agreement and understanding for the need to be admitted and for their admission diagnosis. Consultation has been made with the inpatient physician specialist for hospitalization. PLAN: 
1. Admit to general surgery. Return to ED if worse Diagnosis Clinical Impression: 1. SBO (small bowel obstruction) ([address] 2. Hernia Attestations: This note is prepared by Char Duque, acting as Scribe for Braden Lobo MD. 
 
Braden Lobo MD: The scribe's documentation has been prepared under my direction and personally reviewed by me in its entirety. I confirm that the note above accurately reflects all work, treatment, procedures, and medical decision making performed by me.

## 2018-08-30 NOTE — PROGRESS NOTES
Spiritual Care Partner Volunteer visited patient in general surgery  on 8/30/2018. Documented by: 
Visit by: Rev. Alphonse Zavala D.Min, MA, [facility] Lead  Profession Development & Advancement

## 2018-08-30 NOTE — ED NOTES
Assumed care of patient. Patient presents with chief complaint of vomiting and \"lump\" in her stomach. Area of firmness present around umbilical area for about 6 months. Patient reports multiple episodes of vomiting since Saturday, stating it is difficult for her to keep anything PO down. Patient reports very little bowel activity stating she will have \"some tiny pieces but nothing significant. \" Patient denies diarrhea, dysuria, hematuria, and hematochezia. Patient is alert and oriented x4, respirations even and unlabored, VSS. Monitor x2, call bell within reach.

## 2018-08-30 NOTE — PROGRESS NOTES
Surgery Pt feels better, reports no pain Hernia is softer and can be completely reduced, air and fluid appreciated moving on reduction. Increase activity If has BM will remove NG Bandar in AM 
 
 
Mihai layton. Mychal Campbell MD, [facility]

## 2018-08-30 NOTE — CONSULTS
Hospitalist Consultation Note    NAME:  Heydi Cantor   :   1950   MRN:   [id number]     ATTENDING: Jesse oRmero MD  PCP:  None    Date/Time:  2018 4:42 AM      Recommendations/Plan:       Active Problems:    SBO (small bowel obstruction) ([facility] [address] (2018)       HTN - uncontrolled POA- now improved  Due to Acute distress/SBO  Pt has not been on her regular metoprolol for past few day as she ran out them  IV hydralazine prn for now  Can start scheduled IV metoprolol if HR improved  Resume PO Metoprolol when not NPO/NGT out/SBO resolved    SBO   Due to Ventral/abd wall hernia POA  Preop medical clearance  EKG= non ischemic, NSR    NPO/NGT to suction  Surgical intervention if needed as per  Primary surgical team  Pre-op cardiac risk assessment:  Pt evaluated using revised cardiac risk index and is felt to be low cardiovascular risk for intermediate risk surgery with a 0.4% risk for major complications based on these criteria. This risk has been discussed with the patient and pt wishes to proceed if surgery recommended by gen surgery & can proceed without further cardiac testing if this risk is acceptable per surgery and anesthesia. Further risk reduction will involve medical management of other comorbid conditions in the perioperative period. Code Status: Full  DVT Prophylaxis: SCDs          Subjective:   REQUESTING PHYSICIAN: Dr Luis Todd:    HTN, preop medical clearance  Sandra Davis is a 74 y.o.  female who I was asked to see for preop medical clearance & HTN management. Pt was admitted by [facility] surgery [facility] for SBO due to Ventral Abdominal wall hernia.   Pt has h/o HTN and is taking metoprolol 50 mg BID as per the PCP but has been missing them for past few days as she is awaiting refill authorization from the PCP' soffice ([facility] physicians)  Pt denies any chest pain, SOB, palpitation   BP seems to have improved s/p NGT placement & pain management. Past Medical History:   Diagnosis Date    Arthritis     Hypertension     \"Supposed to be taking medications for HTN but has not\"    Ill-defined condition     Hyperlipidemia      Past Surgical History:   Procedure Laterality Date    HX CHOLECYSTECTOMY      HX HEENT      ffx nose repair 1970's     Social History   Substance Use Topics    Smoking status: Former Smoker    Smokeless tobacco: Not on file      Comment: quit in 2001    Alcohol use Yes      Comment: not every day. Family History   Problem Relation Age of Onset    Diabetes Mother        No Known Allergies   Prior to Admission medications    Medication Sig Start Date End Date Taking? Authorizing Provider   aspirin (ASPIRIN) 325 mg tablet Take 325 mg by mouth daily. Yes Raimundo Evans MD   ergocalciferol (VITAMIN D2) 50,000 unit capsule Take 50,000 Units by mouth daily. Yes Raimundo Evans MD   metoprolol (LOPRESSOR) 25 mg tablet Take 1 Tab by mouth every twelve (12) hours. 9/3/11  Yes Renan Beard MD   pravastatin (PRAVACHOL) 40 mg tablet Take 1 Tab by mouth nightly.  9/3/11  Yes Renan Beard MD       REVIEW OF SYSTEMS:     Total of 12 systems reviewed as follows:           POSITIVE= underlined text  Negative = text not underlined  General:  fever, chills, sweats, generalized weakness, weight loss/gain,      loss of appetite   Eyes:    blurred vision, eye pain, loss of vision, double vision  ENT:    rhinorrhea, pharyngitis   Respiratory:   cough, sputum production, SOB, wheezing, FLETCHER, pleuritic pain   Cardiology:   chest pain, palpitations, orthopnea, PND, edema, syncope   Gastrointestinal:  abdominal pain , N/V, dysphagia, diarrhea, constipation, bleeding   Genitourinary:  frequency, urgency, dysuria, hematuria, incontinence   Muskuloskeletal :  arthralgia, myalgia   Hematology:  easy bruising, nose or gum bleeding, lymphadenopathy   Dermatological: rash, ulceration, pruritis   Endocrine:   hot flashes or polydipsia   Neurological:  headache, dizziness, confusion, focal weakness, paresthesia,     Speech difficulties, memory loss, gait disturbance  Psychological: Feelings of anxiety, depression, agitation    Objective:   VITALS:    Visit Vitals    /90    Pulse 98    Temp 98.9 °F (37.2 °C)    Resp 16    Ht 5' 6\" (1.676 m)    Wt 107.1 kg (236 lb 1.8 oz)    SpO2 99%    BMI 38.11 kg/m2     Temp (24hrs), Av.9 °F (37.2 °C), Min:98.9 °F (37.2 °C), Max:98.9 °F (37.2 °C)      PHYSICAL EXAM:   General:    Alert, cooperative, no distress, appears stated age. , Obese +  HEENT: Atraumatic, anicteric sclerae, pink conjunctivae     No oral ulcers, mucosa moist, throat clear  Neck:  Supple, symmetrical,  thyroid: non tender  Lungs:   Clear to auscultation bilaterally. No Wheezing or Rhonchi. No rales. Chest wall:  No tenderness  No Accessory muscle use. Heart:   Regular  rhythm,  No  murmur   No edema  Abdomen:   Soft, non tender ventral hernia noted +. Not distended. Bowel sounds normal  Extremities: No cyanosis. No clubbing  Skin:     Not pale. Not Jaundiced  No rashes   Psych:  Good insight. Not depressed. Not anxious or agitated. Neurologic: EOMs intact. No facial asymmetry. No aphasia or slurred speech.  Symmetrical strength, Alert and oriented X 4.     _______________________________________________________________________  Care Plan discussed with:    Comments   Patient x    Family      RN x    Care Manager                    Consultant:  mary Philip (Surgery)   ____________________________________________________________________  TOTAL TIME:  40    mins    Comments    x Reviewed previous records   >50% of visit spent in counseling and coordination of care x Discussion with patient and questions answered       Critical Care Provided     Minutes non procedure based  ________________________________________________________________________  Signed: Clemente Bills MD      Procedures: see electronic medical records for all procedures/Xrays and details which were not copied into this note but were reviewed prior to creation of Plan. LAB DATA REVIEWED:    Recent Results (from the past 24 hour(s))   CBC WITH AUTOMATED DIFF    Collection Time: 08/30/18 12:36 AM   Result Value Ref Range    WBC 8.3 3.6 - 11.0 K/uL    RBC 4.83 3.80 - 5.20 M/uL    HGB 14.2 11.5 - 16.0 g/dL    HCT 42.1 35.0 - 47.0 %    MCV 87.2 80.0 - 99.0 FL    MCH 29.4 26.0 - 34.0 PG    MCHC 33.7 30.0 - 36.5 g/dL    RDW 13.0 11.5 - 14.5 %    PLATELET 941 551 - 620 K/uL    MPV 9.7 8.9 - 12.9 FL    NRBC 0.0 0  WBC    ABSOLUTE NRBC 0.00 0.00 - 0.01 K/uL    NEUTROPHILS 82 (H) 32 - 75 %    LYMPHOCYTES 13 12 - 49 %    MONOCYTES 5 5 - 13 %    EOSINOPHILS 0 0 - 7 %    BASOPHILS 0 0 - 1 %    IMMATURE GRANULOCYTES 0 0.0 - 0.5 %    ABS. NEUTROPHILS 6.8 1.8 - 8.0 K/UL    ABS. LYMPHOCYTES 1.1 0.8 - 3.5 K/UL    ABS. MONOCYTES 0.4 0.0 - 1.0 K/UL    ABS. EOSINOPHILS 0.0 0.0 - 0.4 K/UL    ABS. BASOPHILS 0.0 0.0 - 0.1 K/UL    ABS. IMM. GRANS. 0.0 0.00 - 0.04 K/UL    DF AUTOMATED     METABOLIC PANEL, COMPREHENSIVE    Collection Time: 08/30/18 12:36 AM   Result Value Ref Range    Sodium 140 136 - 145 mmol/L    Potassium 3.5 3.5 - 5.1 mmol/L    Chloride 104 97 - 108 mmol/L    CO2 24 21 - 32 mmol/L    Anion gap 12 5 - 15 mmol/L    Glucose 110 (H) 65 - 100 mg/dL    BUN 23 (H) 6 - 20 MG/DL    Creatinine 0.98 0.55 - 1.02 MG/DL    BUN/Creatinine ratio 23 (H) 12 - 20      GFR est AA >60 >60 ml/min/1.73m2    GFR est non-AA 57 (L) >60 ml/min/1.73m2    Calcium 9.2 8.5 - 10.1 MG/DL    Bilirubin, total 1.1 (H) 0.2 - 1.0 MG/DL    ALT (SGPT) 41 12 - 78 U/L    AST (SGOT) 25 15 - 37 U/L    Alk.  phosphatase 72 45 - 117 U/L    Protein, total 7.9 6.4 - 8.2 g/dL    Albumin 3.6 3.5 - 5.0 g/dL    Globulin 4.3 (H) 2.0 - 4.0 g/dL    A-G Ratio 0.8 (L) 1.1 - 2.2     LIPASE    Collection Time: 08/30/18 12:36 AM   Result Value Ref Range    Lipase 127 73 - 393 U/L   LACTIC ACID Collection Time: 08/30/18 12:36 AM   Result Value Ref Range    Lactic acid 1.1 0.4 - 2.0 MMOL/L   URINALYSIS W/ REFLEX CULTURE    Collection Time: 08/30/18  2:03 AM   Result Value Ref Range    Color YELLOW/STRAW      Appearance CLEAR CLEAR      Specific gravity >1.030 (H) 1.003 - 1.030    pH (UA) 5.5 5.0 - 8.0      Protein 30 (A) NEG mg/dL    Glucose NEGATIVE  NEG mg/dL    Ketone 80 (A) NEG mg/dL    Blood TRACE (A) NEG      Urobilinogen 1.0 0.2 - 1.0 EU/dL    Nitrites NEGATIVE  NEG      Leukocyte Esterase SMALL (A) NEG      WBC 10-20 0 - 4 /hpf    RBC 0-5 0 - 5 /hpf    Epithelial cells FEW FEW /lpf    Bacteria NEGATIVE  NEG /hpf    UA:UC IF INDICATED URINE CULTURE ORDERED (A) CNI     BILIRUBIN, CONFIRM    Collection Time: 08/30/18  2:03 AM   Result Value Ref Range    Bilirubin UA, confirm NEGATIVE  NEG         _____________________________  Hospitalist: Marli Calabrese MD

## 2018-08-30 NOTE — ED NOTES
Bedside and Verbal shift change report given to Gissell Gonzalez RN (oncoming nurse) by Olivia Evans RN (offgoing nurse). Report included the following information SBAR, Kardex, ED Summary, [facility] and Recent Results.

## 2018-08-31 ENCOUNTER — APPOINTMENT (OUTPATIENT)
Dept: GENERAL RADIOLOGY | Age: 68
DRG: 355 | End: 2018-08-31
Attending: FAMILY MEDICINE
Payer: MEDICARE

## 2018-08-31 LAB
BACTERIA SPEC CULT: NORMAL
CC UR VC: NORMAL
SERVICE CMNT-IMP: NORMAL

## 2018-08-31 PROCEDURE — 74011000250 HC RX REV CODE- 250: Performed by: INTERNAL MEDICINE

## 2018-08-31 PROCEDURE — 65660000000 HC RM CCU STEPDOWN

## 2018-08-31 PROCEDURE — 74019 RADEX ABDOMEN 2 VIEWS: CPT

## 2018-08-31 PROCEDURE — 74011250636 HC RX REV CODE- 250/636: Performed by: FAMILY MEDICINE

## 2018-08-31 RX ORDER — HEPARIN SODIUM 5000 [USP'U]/ML
5000 INJECTION, SOLUTION INTRAVENOUS; SUBCUTANEOUS EVERY 12 HOURS
Status: DISCONTINUED | OUTPATIENT
Start: 2018-08-31 | End: 2018-08-31

## 2018-08-31 RX ADMIN — Medication 10 ML: at 12:48

## 2018-08-31 RX ADMIN — DEXTROSE MONOHYDRATE, SODIUM CHLORIDE, AND POTASSIUM CHLORIDE 100 ML/HR: 50; 4.5; .745 INJECTION, SOLUTION INTRAVENOUS at 10:38

## 2018-08-31 RX ADMIN — Medication 10 ML: at 06:26

## 2018-08-31 RX ADMIN — Medication 10 ML: at 22:02

## 2018-08-31 RX ADMIN — METOPROLOL TARTRATE 5 MG: 5 INJECTION, SOLUTION INTRAVENOUS at 12:48

## 2018-08-31 RX ADMIN — METOPROLOL TARTRATE 5 MG: 5 INJECTION, SOLUTION INTRAVENOUS at 06:26

## 2018-08-31 RX ADMIN — METOPROLOL TARTRATE 5 MG: 5 INJECTION, SOLUTION INTRAVENOUS at 00:05

## 2018-08-31 RX ADMIN — DEXTROSE MONOHYDRATE, SODIUM CHLORIDE, AND POTASSIUM CHLORIDE 100 ML/HR: 50; 4.5; .745 INJECTION, SOLUTION INTRAVENOUS at 00:25

## 2018-08-31 RX ADMIN — METOPROLOL TARTRATE 5 MG: 5 INJECTION, SOLUTION INTRAVENOUS at 18:12

## 2018-08-31 RX ADMIN — DEXTROSE MONOHYDRATE, SODIUM CHLORIDE, AND POTASSIUM CHLORIDE 100 ML/HR: 50; 4.5; .745 INJECTION, SOLUTION INTRAVENOUS at 22:01

## 2018-08-31 NOTE — PROGRESS NOTES
Reason for Admission:   SBO 
                
RRAT Score:     11 Plan for utilizing home health:  No home health at this time. Likelihood of Readmission:  LOW Transition of Care Plan: 
 
Pt is an 74 y.o.  female, admitted for SBO. Pt was alert and oriented, upon CM room visit. Pt reported that she resides alone in their one story home (4 steps into main entrance). Pt reported that she is independent with ADLs, and drives. Pt reported that she doesn't have a PCP: CM will provide pt with PCP list.  Pt was last seen Oct 2017 and uses [facility]. Pt reported no DME at home. Pt reported that she has had [place] in the past and inpatient rehab, provided by [facility]. Pt denies needing additional services at d/c. CM will continue to follow up with pt and make referrals as deemed necessary. Care Management Interventions PCP Verified by CM: Yes Mode of Transport at Discharge: Other (see comment) Transition of Care Consult (CM Consult): Discharge Planning Discharge Durable Medical Equipment: No 
Physical Therapy Consult: No 
Occupational Therapy Consult: No 
Speech Therapy Consult: No 
Current Support Network: Lives Alone, Own Home Confirm Follow Up Transport: Family Plan discussed with Pt/Family/Caregiver: Yes Discharge Location Discharge Placement: Home IDRIS Andersen  
[phone]

## 2018-08-31 NOTE — PROGRESS NOTES
Problem: Falls - Risk of 
Goal: *Absence of Falls Document Valentina Miramontes Fall Risk and appropriate interventions in the flowsheet. Outcome: Progressing Towards Goal 
Fall Risk Interventions: 
  
 
  
 
Medication Interventions: Patient to call before getting OOB, Teach patient to arise slowly Problem: Pressure Injury - Risk of 
Goal: *Prevention of pressure injury Document Jose Scale and appropriate interventions in the flowsheet. Outcome: Progressing Towards Goal 
Pressure Injury Interventions: 
  
 
Moisture Interventions: Absorbent underpads, Apply protective barrier, creams and emollients Activity Interventions: Increase time out of bed, Pressure redistribution bed/mattress(bed type) Mobility Interventions: HOB 30 degrees or less, Pressure redistribution bed/mattress (bed type) Nutrition Interventions: Document food/fluid/supplement intake Friction and Shear Interventions: HOB 30 degrees or less, Apply protective barrier, creams and emollients Comments: Oriented Tele RA Patricia 
- NG came out 8/30 
- passing gas Up to toilet L wrist - D5 0.45 10 meq K 
 
----------------------- Per MD note, pt prefers elect. Sx O/P in future

## 2018-08-31 NOTE — PROGRESS NOTES
Surgery NG came out, tolerating out Xrays:\"impression: Slight decrease in small bowel distention. Persistent gas in the 
colon as above. \" Hernia reduces easily Recheck this afternoon, passing flatus Start clears Would prefer elective surgery as outpt in future [employer] juana Molina 
[facility]

## 2018-08-31 NOTE — PROGRESS NOTES
Hospitalist Progress Note NAME: Ann Mendieta :  1950 MRN:  [id number] Assessment / Plan: Hypertension, benign/essential: fairly well controlled with addition of IV metoprolol 
- con't IV metoprolol while NPO (can restart oral lopressor and d/c telemetry when cleared for po intake) - prn nitrobid Hyperlipidemia: con't holding statin while NPO. Reviewed with Pt today.  
SBO due to ventral wall hernia: Accidentally removed NG tube overnight, says she doesn't remember what happened. - CT A/P with small bowel obstruction due to abdominal wall hernia 
- see initial consult regarding cardiac clearance if needed 
- NG tube replacement if desired by primary team; Pt says she is passing flatus but no BM 
- further mgmt per primary team 
Obesity Body mass index is 38.11 kg/(m^2) 
   
Code Status: Full DVT Prophylaxis: heparin Subjective: Chief Complaint / Reason for Physician Visit \"I don't know what happened (with NG)\". Denies pain. Passing flatus. Discussed with RN events overnight. Review of Systems: 
Symptom Y/N Comments  Symptom Y/N Comments Fever/Chills n   Chest Pain n   
Poor Appetite n   Edema n   
Cough n   Abdominal Pain n   
Sputum n   Joint Pain SOB/FLETCHER n   Pruritis/Rash Nausea/vomit    Tolerating PT/OT Diarrhea    Tolerating Diet Constipation    Other Could NOT obtain due to:   
 
Objective: VITALS:  
Last 24hrs VS reviewed since prior progress note. Most recent are: 
Patient Vitals for the past 24 hrs: 
 Temp Pulse Resp BP SpO2  
18 0833 98.7 °F (37.1 °C) 97 16 (!) 148/98 97 % 18 0350 98.3 °F (36.8 °C) 98 16 138/90 94 % 18 2340 98.2 °F (36.8 °C) 100 16 133/90 92 % 18 98.5 °F (36.9 °C) 94 16 (!) 131/95 95 % 18 1539 98.2 °F (36.8 °C) 91 16 141/90 95 % 18 1236 98.1 °F (36.7 °C) 97 16 135/82 97 % 18 1153 98.2 °F (36.8 °C) 94 16 (!) 143/91 95 % Intake/Output Summary (Last 24 hours) at 08/31/18 0848 Last data filed at 08/31/18 9289 Gross per 24 hour Intake             2685 ml Output              450 ml Net             2235 ml PHYSICAL EXAM: 
General: WD, WN. Alert, cooperative, no acute distress   
EENT:  EOMI. Anicteric sclerae. MMM Resp:  CTA bilaterally, no wheezing or rales. No accessory muscle use CV:  Regular  rhythm,  No edema GI:  Soft, moderately distended, Non tender.  Bowel sounds diminished but present Neurologic:  Alert and oriented X 3, normal speech, Psych:   Fair insight. Not anxious nor agitated Skin:  No rashes. No jaundice Reviewed most current lab test results and cultures  YES Reviewed most current radiology test results   YES Review and summation of old records today    NO Reviewed patient's current orders and MAR    YES 
PMH/ reviewed - no change compared to H&P 
________________________________________________________________________ Care Plan discussed with: 
  Comments Patient x Family RN Care Manager Consultant Multidiciplinary team rounds were held today with , nursing, pharmacist and clinical coordinator. Patient's plan of care was discussed; medications were reviewed and discharge planning was addressed. ________________________________________________________________________ Total NON critical care TIME:  25 Minutes Total CRITICAL CARE TIME Spent:   Minutes non procedure based Comments >50% of visit spent in counseling and coordination of care x   
________________________________________________________________________ James Nelson MD  
 
Procedures: see electronic medical records for all procedures/Xrays and details which were not copied into this note but were reviewed prior to creation of Plan. LABS: 
I reviewed today's most current labs and imaging studies. Pertinent labs include: 
Recent Labs 08/30/18 032 702 26 96  08/30/18 
 0036 WBC  7.3  8.3 HGB  13.5  14.2 HCT  40.1  42.1 PLT  214  243 Recent Labs 08/30/18 
 1246  08/30/18 
 0036 NA  138  140  
K  3.4*  3.5 CL  103  104 CO2  27  24 GLU  124*  110* BUN  14  23* CREA  0.88  0.98  
CA  8.5  9.2 ALB  3.3*  3.6 TBILI  1.2*  1.1*  
SGOT  20  25 ALT  36  41 Signed: Apryl Pino MD

## 2018-08-31 NOTE — PROGRESS NOTES
General Surgery End of Shift Nursing Note Bedside shift change report given to KRISTY LANE (oncoming nurse) by Denisse Hernández RN (offgoing nurse). Report included the following information SBAR, Kardex, Intake/Output, MAR and Recent Results. Shift worked:   7p-7a Summary of shift:    Pt medicated for c/o pain as requested. Pt slept most of the shift. At 0630 pt woke because she was coughing and her NGT came out. Out put this shift was 150. Dr. Dawit Amezcua notified. He said to wait until the abdominal x-ray came back and he will decide @ that point if the NGT needs to go back in. Issues for physician to address:   N/A Number times ambulated in hallway past shift: 0 Number of times OOB to chair past shift: 2 Pain Management: 
Current medication: Dilaudid Patient states pain is manageable on current pain medication: YES 
 
GI: 
 
Current diet:  DIET NPO Tolerating current diet: YES Passing flatus: YES Last Bowel Movement: several days ago Respiratory: 
 
Incentive Spirometer at bedside: YES Patient instructed on use: YES Patient Safety: 
 
Falls Score: 1 Bed Alarm On? No 
Sitter?  No 
 
Ada Singleton, RAFFY

## 2018-08-31 NOTE — PROGRESS NOTES
Problem: Falls - Risk of 
Goal: *Absence of Falls Document Tanesha Latin Fall Risk and appropriate interventions in the flowsheet. Outcome: Progressing Towards Goal 
Fall Risk Interventions: 
  
 
  
 
Medication Interventions: Patient to call before getting OOB

## 2018-09-01 ENCOUNTER — APPOINTMENT (OUTPATIENT)
Dept: GENERAL RADIOLOGY | Age: 68
DRG: 355 | End: 2018-09-01
Attending: FAMILY MEDICINE
Payer: MEDICARE

## 2018-09-01 PROCEDURE — 74011250636 HC RX REV CODE- 250/636: Performed by: FAMILY MEDICINE

## 2018-09-01 PROCEDURE — 74019 RADEX ABDOMEN 2 VIEWS: CPT

## 2018-09-01 PROCEDURE — 74011000250 HC RX REV CODE- 250: Performed by: INTERNAL MEDICINE

## 2018-09-01 PROCEDURE — 74011250637 HC RX REV CODE- 250/637: Performed by: INTERNAL MEDICINE

## 2018-09-01 PROCEDURE — 65660000000 HC RM CCU STEPDOWN

## 2018-09-01 RX ORDER — METOPROLOL TARTRATE 5 MG/5ML
5 INJECTION INTRAVENOUS EVERY 6 HOURS
Status: DISCONTINUED | OUTPATIENT
Start: 2018-09-02 | End: 2018-09-03

## 2018-09-01 RX ORDER — ASPIRIN 325 MG
325 TABLET ORAL DAILY
Status: DISCONTINUED | OUTPATIENT
Start: 2018-09-01 | End: 2018-09-04 | Stop reason: HOSPADM

## 2018-09-01 RX ORDER — PRAVASTATIN SODIUM 40 MG/1
40 TABLET ORAL
Status: DISCONTINUED | OUTPATIENT
Start: 2018-09-01 | End: 2018-09-04 | Stop reason: HOSPADM

## 2018-09-01 RX ORDER — METOPROLOL TARTRATE 25 MG/1
25 TABLET, FILM COATED ORAL EVERY 12 HOURS
Status: COMPLETED | OUTPATIENT
Start: 2018-09-01 | End: 2018-09-01

## 2018-09-01 RX ADMIN — METOPROLOL TARTRATE 5 MG: 5 INJECTION, SOLUTION INTRAVENOUS at 00:32

## 2018-09-01 RX ADMIN — DEXTROSE MONOHYDRATE, SODIUM CHLORIDE, AND POTASSIUM CHLORIDE 100 ML/HR: 50; 4.5; .745 INJECTION, SOLUTION INTRAVENOUS at 06:19

## 2018-09-01 RX ADMIN — Medication 10 ML: at 06:03

## 2018-09-01 RX ADMIN — PRAVASTATIN SODIUM 40 MG: 40 TABLET ORAL at 23:16

## 2018-09-01 RX ADMIN — METOPROLOL TARTRATE 25 MG: 25 TABLET ORAL at 08:20

## 2018-09-01 RX ADMIN — METOPROLOL TARTRATE 5 MG: 5 INJECTION, SOLUTION INTRAVENOUS at 06:03

## 2018-09-01 RX ADMIN — ASPIRIN 325 MG: 325 TABLET ORAL at 08:20

## 2018-09-01 RX ADMIN — LORAZEPAM 1 MG: 2 INJECTION INTRAMUSCULAR; INTRAVENOUS at 23:17

## 2018-09-01 RX ADMIN — ONDANSETRON 4 MG: 2 INJECTION INTRAMUSCULAR; INTRAVENOUS at 20:35

## 2018-09-01 RX ADMIN — METOPROLOL TARTRATE 25 MG: 25 TABLET ORAL at 20:35

## 2018-09-01 RX ADMIN — Medication 10 ML: at 23:20

## 2018-09-01 NOTE — PROGRESS NOTES
Problem: Falls - Risk of 
Goal: *Absence of Falls Document Nayely Tavaress Fall Risk and appropriate interventions in the flowsheet. Outcome: Progressing Towards Goal 
Fall Risk Interventions: 
  
 
  
 
Medication Interventions: Patient to call before getting OOB Problem: Pressure Injury - Risk of 
Goal: *Prevention of pressure injury Document Jose Scale and appropriate interventions in the flowsheet. Outcome: Progressing Towards Goal 
Pressure Injury Interventions: 
Sensory Interventions: Assess changes in LOC Moisture Interventions: Assess need for specialty bed Activity Interventions: Assess need for specialty bed Mobility Interventions: Assess need for specialty bed Nutrition Interventions: Document food/fluid/supplement intake Friction and Shear Interventions: Apply protective barrier, creams and emollients

## 2018-09-01 NOTE — PROGRESS NOTES
Hospitalist Progress Note NAME: Norah Gloria :  1950 MRN:  [id number] Assessment / Plan: Hypertension, benign/essential:  
- oral metoprolol today 
- will change to IV metoprolol when NPO tomorrow - prn nitrobid Hypokalemia: 
- replacing IV while NPO 
- check mag, phos and BMP in AM 
Hyperlipidemia: con't statin while taking po  
SBO due to ventral wall hernia: Accidentally removed NG tube evening after admission. Still no BM, hernia needed to be reduced again today 
- CT A/P with small bowel obstruction due to abdominal wall hernia 
- see initial consult regarding cardiac clearance if needed - clears today, NPO at MN for possible surgical repair in AM 
- further mgmt per primary team 
Obesity Body mass index is 38.11 kg/(m^2) 
   
Code Status: Full DVT Prophylaxis: heparin Subjective: Chief Complaint / Reason for Physician Visit \"I guess its better to just get it done\". Discussed with RN events overnight. Review of Systems: 
Symptom Y/N Comments  Symptom Y/N Comments Fever/Chills n   Chest Pain n   
Poor Appetite n   Edema n   
Cough n   Abdominal Pain n   
Sputum n   Joint Pain SOB/FLETCHER n   Pruritis/Rash Nausea/vomit    Tolerating PT/OT Diarrhea    Tolerating Diet Constipation    Other Could NOT obtain due to:   
 
Objective: VITALS:  
Last 24hrs VS reviewed since prior progress note. Most recent are: 
Patient Vitals for the past 24 hrs: 
 Temp Pulse Resp BP SpO2  
18 0848 98.3 °F (36.8 °C) 83 16 (!) 154/97 95 % 18 0355 98.7 °F (37.1 °C) 97 15 140/89 95 % 18 2357 98.6 °F (37 °C) 95 16 133/82 95 % 18 1929 98.5 °F (36.9 °C) 87 17 136/90 98 % 18 1602 99 °F (37.2 °C) 90 16 130/88 97 % Intake/Output Summary (Last 24 hours) at 18 1051 Last data filed at 18 9584 Gross per 24 hour Intake             6260 ml Output              500 ml Net             5760 ml PHYSICAL EXAM: 
 General: Obese. Alert, cooperative, no acute distress   
EENT:  EOMI. Anicteric sclerae. MMM Resp:  CTA bilaterally, no wheezing or rales. No accessory muscle use CV:  Regular  rhythm,  No edema GI:  Soft, moderately distended, Non tender. Diminished bowel sounds Neurologic:  Alert and oriented X 3, normal speech, Psych:   Fair insight. Not anxious nor agitated Skin:  No rashes. No jaundice Reviewed most current lab test results and cultures  YES Reviewed most current radiology test results   YES Review and summation of old records today    NO Reviewed patient's current orders and MAR    YES 
PMH/SH reviewed - no change compared to H&P 
________________________________________________________________________ Care Plan discussed with: 
  Comments Patient x Family RN x Care Manager Consultant  x surgery Multidiciplinary team rounds were held today with , nursing, pharmacist and clinical coordinator. Patient's plan of care was discussed; medications were reviewed and discharge planning was addressed. ________________________________________________________________________ Total NON critical care TIME:  20 Minutes Total CRITICAL CARE TIME Spent:   Minutes non procedure based Comments >50% of visit spent in counseling and coordination of care x   
________________________________________________________________________ Kofi Mccarthy MD  
 
Procedures: see electronic medical records for all procedures/Xrays and details which were not copied into this note but were reviewed prior to creation of Plan. LABS: 
I reviewed today's most current labs and imaging studies. Pertinent labs include: 
Recent Labs 08/30/18 
 1246  08/30/18 
 0036 WBC  7.3  8.3 HGB  13.5  14.2 HCT  40.1  42.1 PLT  214  243 Recent Labs 08/30/18 
 1246  08/30/18 0036 NA  138  140  
K  3.4*  3.5 CL  103  104 CO2  27  24 GLU  124*  110* BUN  14  23* CREA  0.88  0.98  
CA  8.5  9.2 ALB  3.3*  3.6 TBILI  1.2*  1.1*  
SGOT  20  25 ALT  36  41 Signed: Pilo Metz MD

## 2018-09-01 NOTE — PROGRESS NOTES
Problem: Falls - Risk of 
Goal: *Absence of Falls Document Samreen Heredia Fall Risk and appropriate interventions in the flowsheet. Outcome: Progressing Towards Goal 
Fall Risk Interventions: 
  
 
  
 
Medication Interventions: Patient to call before getting OOB Problem: Pressure Injury - Risk of 
Goal: *Prevention of pressure injury Document Jose Scale and appropriate interventions in the flowsheet. Pressure Injury Interventions: 
Sensory Interventions: Assess changes in LOC Moisture Interventions: Assess need for specialty bed Activity Interventions: Increase time out of bed, Pressure redistribution bed/mattress(bed type) Mobility Interventions: HOB 30 degrees or less, Pressure redistribution bed/mattress (bed type) Nutrition Interventions: Document food/fluid/supplement intake Friction and Shear Interventions: HOB 30 degrees or less Problem: General Medical Care Plan Goal: *Labs within defined limits Oriented Tele RA 
 
NPO after MN Hat toilet Up per self 9/1 XR abd - persist SBO

## 2018-09-01 NOTE — PROGRESS NOTES
Problem: General Medical Care Plan Goal: *Optimal pain control at patient's stated goal 
Outcome: Progressing Towards Goal 
Patient denies pain, passing gas, tolerating clear liquids; states she's hungry for a steak. Denies N/V.

## 2018-09-01 NOTE — PROGRESS NOTES
Surgery Pt joss clears but no BM yet. Feels a little bloated Hernia more difficult to reduc today but completely reduced. Cont clears NPO after MN If SBO not clearly resolved in AM will anticipate surgery tomorrow Milan Lanejennifer Amezcua MD, [address] [facility]

## 2018-09-01 NOTE — PROGRESS NOTES
Problem: Falls - Risk of 
Goal: *Absence of Falls Document Freddy Campos Fall Risk and appropriate interventions in the flowsheet. Outcome: Progressing Towards Goal 
Fall Risk Interventions: 
  
 
  
 
Medication Interventions: Patient to call before getting OOB Problem: Pressure Injury - Risk of 
Goal: *Prevention of pressure injury Document Jose Scale and appropriate interventions in the flowsheet. Outcome: Progressing Towards Goal 
Pressure Injury Interventions: 
Sensory Interventions: Assess changes in LOC Moisture Interventions: Assess need for specialty bed Activity Interventions: Increase time out of bed, Pressure redistribution bed/mattress(bed type) Mobility Interventions: HOB 30 degrees or less, Pressure redistribution bed/mattress (bed type) Nutrition Interventions: Document food/fluid/supplement intake Friction and Shear Interventions: HOB 30 degrees or less

## 2018-09-02 ENCOUNTER — ANESTHESIA (OUTPATIENT)
Dept: SURGERY | Age: 68
DRG: 355 | End: 2018-09-02
Payer: MEDICARE

## 2018-09-02 ENCOUNTER — ANESTHESIA EVENT (OUTPATIENT)
Dept: SURGERY | Age: 68
DRG: 355 | End: 2018-09-02
Payer: MEDICARE

## 2018-09-02 LAB
ANION GAP SERPL CALC-SCNC: 10 MMOL/L (ref 5–15)
BUN SERPL-MCNC: 7 MG/DL (ref 6–20)
BUN/CREAT SERPL: 8 (ref 12–20)
CALCIUM SERPL-MCNC: 9.2 MG/DL (ref 8.5–10.1)
CHLORIDE SERPL-SCNC: 104 MMOL/L (ref 97–108)
CO2 SERPL-SCNC: 26 MMOL/L (ref 21–32)
CREAT SERPL-MCNC: 0.84 MG/DL (ref 0.55–1.02)
ERYTHROCYTE [DISTWIDTH] IN BLOOD BY AUTOMATED COUNT: 13.1 % (ref 11.5–14.5)
GLUCOSE SERPL-MCNC: 114 MG/DL (ref 65–100)
HCT VFR BLD AUTO: 41.8 % (ref 35–47)
HGB BLD-MCNC: 13.8 G/DL (ref 11.5–16)
MCH RBC QN AUTO: 29.4 PG (ref 26–34)
MCHC RBC AUTO-ENTMCNC: 33 G/DL (ref 30–36.5)
MCV RBC AUTO: 88.9 FL (ref 80–99)
NRBC # BLD: 0 K/UL (ref 0–0.01)
NRBC BLD-RTO: 0 PER 100 WBC
PLATELET # BLD AUTO: 256 K/UL (ref 150–400)
PMV BLD AUTO: 9.6 FL (ref 8.9–12.9)
POTASSIUM SERPL-SCNC: 3.3 MMOL/L (ref 3.5–5.1)
RBC # BLD AUTO: 4.7 M/UL (ref 3.8–5.2)
SODIUM SERPL-SCNC: 140 MMOL/L (ref 136–145)
WBC # BLD AUTO: 8.8 K/UL (ref 3.6–11)

## 2018-09-02 PROCEDURE — 76010000149 HC OR TIME 1 TO 1.5 HR: Performed by: FAMILY MEDICINE

## 2018-09-02 PROCEDURE — 74011250636 HC RX REV CODE- 250/636

## 2018-09-02 PROCEDURE — C1781 MESH (IMPLANTABLE): HCPCS | Performed by: FAMILY MEDICINE

## 2018-09-02 PROCEDURE — 76210000006 HC OR PH I REC 0.5 TO 1 HR: Performed by: FAMILY MEDICINE

## 2018-09-02 PROCEDURE — 77010033678 HC OXYGEN DAILY

## 2018-09-02 PROCEDURE — 77030020061 HC IV BLD WRMR ADMIN SET 3M -B: Performed by: ANESTHESIOLOGY

## 2018-09-02 PROCEDURE — 74011250636 HC RX REV CODE- 250/636: Performed by: FAMILY MEDICINE

## 2018-09-02 PROCEDURE — 77030002986 HC SUT PROL J&J -A: Performed by: FAMILY MEDICINE

## 2018-09-02 PROCEDURE — 77030026438 HC STYL ET INTUB CARD -A: Performed by: ANESTHESIOLOGY

## 2018-09-02 PROCEDURE — 77030011640 HC PAD GRND REM COVD -A: Performed by: FAMILY MEDICINE

## 2018-09-02 PROCEDURE — 77030008771 HC TU NG SALEM SUMP -A: Performed by: ANESTHESIOLOGY

## 2018-09-02 PROCEDURE — 74011250636 HC RX REV CODE- 250/636: Performed by: INTERNAL MEDICINE

## 2018-09-02 PROCEDURE — 80048 BASIC METABOLIC PNL TOTAL CA: CPT | Performed by: FAMILY MEDICINE

## 2018-09-02 PROCEDURE — 77030019908 HC STETH ESOPH SIMS -A: Performed by: ANESTHESIOLOGY

## 2018-09-02 PROCEDURE — 76060000033 HC ANESTHESIA 1 TO 1.5 HR: Performed by: FAMILY MEDICINE

## 2018-09-02 PROCEDURE — 94760 N-INVAS EAR/PLS OXIMETRY 1: CPT

## 2018-09-02 PROCEDURE — 77030018836 HC SOL IRR NACL ICUM -A: Performed by: FAMILY MEDICINE

## 2018-09-02 PROCEDURE — 74011000250 HC RX REV CODE- 250

## 2018-09-02 PROCEDURE — 36415 COLL VENOUS BLD VENIPUNCTURE: CPT | Performed by: FAMILY MEDICINE

## 2018-09-02 PROCEDURE — 65660000000 HC RM CCU STEPDOWN

## 2018-09-02 PROCEDURE — 0WUF0JZ SUPPLEMENT ABDOMINAL WALL WITH SYNTHETIC SUBSTITUTE, OPEN APPROACH: ICD-10-PCS | Performed by: FAMILY MEDICINE

## 2018-09-02 PROCEDURE — 85027 COMPLETE CBC AUTOMATED: CPT | Performed by: FAMILY MEDICINE

## 2018-09-02 PROCEDURE — 77030032490 HC SLV COMPR SCD KNE COVD -B: Performed by: FAMILY MEDICINE

## 2018-09-02 PROCEDURE — 77030013079 HC BLNKT BAIR HGGR 3M -A: Performed by: ANESTHESIOLOGY

## 2018-09-02 PROCEDURE — 74011000250 HC RX REV CODE- 250: Performed by: FAMILY MEDICINE

## 2018-09-02 PROCEDURE — 74011000272 HC RX REV CODE- 272: Performed by: FAMILY MEDICINE

## 2018-09-02 PROCEDURE — 74011250637 HC RX REV CODE- 250/637: Performed by: FAMILY MEDICINE

## 2018-09-02 PROCEDURE — 77030002933 HC SUT MCRYL J&J -A: Performed by: FAMILY MEDICINE

## 2018-09-02 PROCEDURE — 74011000250 HC RX REV CODE- 250: Performed by: INTERNAL MEDICINE

## 2018-09-02 PROCEDURE — 77030008684 HC TU ET CUF COVD -B: Performed by: ANESTHESIOLOGY

## 2018-09-02 PROCEDURE — 74011250636 HC RX REV CODE- 250/636: Performed by: ANESTHESIOLOGY

## 2018-09-02 PROCEDURE — 74011250637 HC RX REV CODE- 250/637: Performed by: INTERNAL MEDICINE

## 2018-09-02 DEVICE — VENTRALEX ST HERNIA PATCH, 6.4 CM (2.5"), CIRCLE
Type: IMPLANTABLE DEVICE | Site: ABDOMEN | Status: FUNCTIONAL
Brand: VENTRALEX

## 2018-09-02 RX ORDER — SODIUM CHLORIDE, SODIUM LACTATE, POTASSIUM CHLORIDE, CALCIUM CHLORIDE 600; 310; 30; 20 MG/100ML; MG/100ML; MG/100ML; MG/100ML
25 INJECTION, SOLUTION INTRAVENOUS CONTINUOUS
Status: DISCONTINUED | OUTPATIENT
Start: 2018-09-02 | End: 2018-09-02 | Stop reason: HOSPADM

## 2018-09-02 RX ORDER — SODIUM CHLORIDE 0.9 % (FLUSH) 0.9 %
5-10 SYRINGE (ML) INJECTION EVERY 8 HOURS
Status: DISCONTINUED | OUTPATIENT
Start: 2018-09-02 | End: 2018-09-02 | Stop reason: HOSPADM

## 2018-09-02 RX ORDER — PHENYLEPHRINE HCL IN 0.9% NACL 0.4MG/10ML
SYRINGE (ML) INTRAVENOUS AS NEEDED
Status: DISCONTINUED | OUTPATIENT
Start: 2018-09-02 | End: 2018-09-02 | Stop reason: HOSPADM

## 2018-09-02 RX ORDER — GLYCOPYRROLATE 0.2 MG/ML
INJECTION INTRAMUSCULAR; INTRAVENOUS AS NEEDED
Status: DISCONTINUED | OUTPATIENT
Start: 2018-09-02 | End: 2018-09-02 | Stop reason: HOSPADM

## 2018-09-02 RX ORDER — ONDANSETRON 2 MG/ML
INJECTION INTRAMUSCULAR; INTRAVENOUS AS NEEDED
Status: DISCONTINUED | OUTPATIENT
Start: 2018-09-02 | End: 2018-09-02 | Stop reason: HOSPADM

## 2018-09-02 RX ORDER — CEFAZOLIN SODIUM/WATER 2 G/20 ML
2 SYRINGE (ML) INTRAVENOUS EVERY 8 HOURS
Status: COMPLETED | OUTPATIENT
Start: 2018-09-02 | End: 2018-09-03

## 2018-09-02 RX ORDER — POTASSIUM CHLORIDE 7.45 MG/ML
10 INJECTION INTRAVENOUS
Status: COMPLETED | OUTPATIENT
Start: 2018-09-02 | End: 2018-09-02

## 2018-09-02 RX ORDER — SODIUM CHLORIDE 0.9 % (FLUSH) 0.9 %
5-10 SYRINGE (ML) INJECTION AS NEEDED
Status: DISCONTINUED | OUTPATIENT
Start: 2018-09-02 | End: 2018-09-02 | Stop reason: HOSPADM

## 2018-09-02 RX ORDER — ROCURONIUM BROMIDE 10 MG/ML
INJECTION, SOLUTION INTRAVENOUS AS NEEDED
Status: DISCONTINUED | OUTPATIENT
Start: 2018-09-02 | End: 2018-09-02 | Stop reason: HOSPADM

## 2018-09-02 RX ORDER — CEFAZOLIN SODIUM/WATER 2 G/20 ML
SYRINGE (ML) INTRAVENOUS
Status: COMPLETED
Start: 2018-09-02 | End: 2018-09-02

## 2018-09-02 RX ORDER — DIPHENHYDRAMINE HYDROCHLORIDE 50 MG/ML
12.5 INJECTION, SOLUTION INTRAMUSCULAR; INTRAVENOUS AS NEEDED
Status: DISCONTINUED | OUTPATIENT
Start: 2018-09-02 | End: 2018-09-02 | Stop reason: HOSPADM

## 2018-09-02 RX ORDER — SUCCINYLCHOLINE CHLORIDE 20 MG/ML
INJECTION INTRAMUSCULAR; INTRAVENOUS AS NEEDED
Status: DISCONTINUED | OUTPATIENT
Start: 2018-09-02 | End: 2018-09-02 | Stop reason: HOSPADM

## 2018-09-02 RX ORDER — ACETAMINOPHEN 10 MG/ML
INJECTION, SOLUTION INTRAVENOUS AS NEEDED
Status: DISCONTINUED | OUTPATIENT
Start: 2018-09-02 | End: 2018-09-02 | Stop reason: HOSPADM

## 2018-09-02 RX ORDER — SODIUM CHLORIDE 0.9 % (FLUSH) 0.9 %
5-10 SYRINGE (ML) INJECTION AS NEEDED
Status: DISCONTINUED | OUTPATIENT
Start: 2018-09-02 | End: 2018-09-04 | Stop reason: HOSPADM

## 2018-09-02 RX ORDER — NEOSTIGMINE METHYLSULFATE 1 MG/ML
INJECTION INTRAVENOUS AS NEEDED
Status: DISCONTINUED | OUTPATIENT
Start: 2018-09-02 | End: 2018-09-02 | Stop reason: HOSPADM

## 2018-09-02 RX ORDER — MIDAZOLAM HYDROCHLORIDE 1 MG/ML
INJECTION, SOLUTION INTRAMUSCULAR; INTRAVENOUS AS NEEDED
Status: DISCONTINUED | OUTPATIENT
Start: 2018-09-02 | End: 2018-09-02 | Stop reason: HOSPADM

## 2018-09-02 RX ORDER — PROPOFOL 10 MG/ML
INJECTION, EMULSION INTRAVENOUS AS NEEDED
Status: DISCONTINUED | OUTPATIENT
Start: 2018-09-02 | End: 2018-09-02 | Stop reason: HOSPADM

## 2018-09-02 RX ORDER — DEXAMETHASONE SODIUM PHOSPHATE 4 MG/ML
INJECTION, SOLUTION INTRA-ARTICULAR; INTRALESIONAL; INTRAMUSCULAR; INTRAVENOUS; SOFT TISSUE AS NEEDED
Status: DISCONTINUED | OUTPATIENT
Start: 2018-09-02 | End: 2018-09-02 | Stop reason: HOSPADM

## 2018-09-02 RX ORDER — HYDROCODONE BITARTRATE AND ACETAMINOPHEN 5; 325 MG/1; MG/1
1-2 TABLET ORAL
Status: DISCONTINUED | OUTPATIENT
Start: 2018-09-02 | End: 2018-09-04 | Stop reason: HOSPADM

## 2018-09-02 RX ORDER — HYDROMORPHONE HYDROCHLORIDE 1 MG/ML
0.2 INJECTION, SOLUTION INTRAMUSCULAR; INTRAVENOUS; SUBCUTANEOUS
Status: DISCONTINUED | OUTPATIENT
Start: 2018-09-02 | End: 2018-09-02 | Stop reason: HOSPADM

## 2018-09-02 RX ORDER — FENTANYL CITRATE 50 UG/ML
INJECTION, SOLUTION INTRAMUSCULAR; INTRAVENOUS AS NEEDED
Status: DISCONTINUED | OUTPATIENT
Start: 2018-09-02 | End: 2018-09-02 | Stop reason: HOSPADM

## 2018-09-02 RX ORDER — SODIUM CHLORIDE 0.9 % (FLUSH) 0.9 %
5-10 SYRINGE (ML) INJECTION EVERY 8 HOURS
Status: DISCONTINUED | OUTPATIENT
Start: 2018-09-02 | End: 2018-09-04 | Stop reason: HOSPADM

## 2018-09-02 RX ORDER — ONDANSETRON 2 MG/ML
4 INJECTION INTRAMUSCULAR; INTRAVENOUS
Status: DISCONTINUED | OUTPATIENT
Start: 2018-09-02 | End: 2018-09-02

## 2018-09-02 RX ORDER — FENTANYL CITRATE 50 UG/ML
25 INJECTION, SOLUTION INTRAMUSCULAR; INTRAVENOUS
Status: DISCONTINUED | OUTPATIENT
Start: 2018-09-02 | End: 2018-09-02 | Stop reason: HOSPADM

## 2018-09-02 RX ORDER — LIDOCAINE HYDROCHLORIDE 20 MG/ML
INJECTION, SOLUTION EPIDURAL; INFILTRATION; INTRACAUDAL; PERINEURAL AS NEEDED
Status: DISCONTINUED | OUTPATIENT
Start: 2018-09-02 | End: 2018-09-02 | Stop reason: HOSPADM

## 2018-09-02 RX ORDER — ENOXAPARIN SODIUM 100 MG/ML
40 INJECTION SUBCUTANEOUS EVERY 24 HOURS
Status: DISCONTINUED | OUTPATIENT
Start: 2018-09-03 | End: 2018-09-04 | Stop reason: HOSPADM

## 2018-09-02 RX ORDER — LIDOCAINE HYDROCHLORIDE 10 MG/ML
0.1 INJECTION, SOLUTION EPIDURAL; INFILTRATION; INTRACAUDAL; PERINEURAL AS NEEDED
Status: DISCONTINUED | OUTPATIENT
Start: 2018-09-02 | End: 2018-09-02

## 2018-09-02 RX ADMIN — SUCCINYLCHOLINE CHLORIDE 140 MG: 20 INJECTION INTRAMUSCULAR; INTRAVENOUS at 19:21

## 2018-09-02 RX ADMIN — METOPROLOL TARTRATE 5 MG: 1 INJECTION, SOLUTION INTRAVENOUS at 12:38

## 2018-09-02 RX ADMIN — Medication 10 ML: at 14:34

## 2018-09-02 RX ADMIN — Medication 40 MCG: at 19:57

## 2018-09-02 RX ADMIN — Medication 10 ML: at 21:33

## 2018-09-02 RX ADMIN — METOPROLOL TARTRATE 5 MG: 1 INJECTION, SOLUTION INTRAVENOUS at 17:50

## 2018-09-02 RX ADMIN — ONDANSETRON 4 MG: 2 INJECTION INTRAMUSCULAR; INTRAVENOUS at 21:34

## 2018-09-02 RX ADMIN — METOPROLOL TARTRATE 5 MG: 1 INJECTION, SOLUTION INTRAVENOUS at 06:10

## 2018-09-02 RX ADMIN — ROCURONIUM BROMIDE 10 MG: 10 INJECTION, SOLUTION INTRAVENOUS at 19:21

## 2018-09-02 RX ADMIN — POTASSIUM CHLORIDE 10 MEQ: 10 INJECTION, SOLUTION INTRAVENOUS at 11:18

## 2018-09-02 RX ADMIN — PRAVASTATIN SODIUM 40 MG: 40 TABLET ORAL at 22:23

## 2018-09-02 RX ADMIN — HYDROMORPHONE HYDROCHLORIDE 1 MG: 2 INJECTION, SOLUTION INTRAMUSCULAR; INTRAVENOUS; SUBCUTANEOUS at 21:34

## 2018-09-02 RX ADMIN — Medication 2 G: at 19:30

## 2018-09-02 RX ADMIN — Medication 80 MCG: at 19:56

## 2018-09-02 RX ADMIN — Medication 120 MCG: at 19:25

## 2018-09-02 RX ADMIN — ACETAMINOPHEN 1000 MG: 10 INJECTION, SOLUTION INTRAVENOUS at 19:47

## 2018-09-02 RX ADMIN — POTASSIUM CHLORIDE 10 MEQ: 10 INJECTION, SOLUTION INTRAVENOUS at 09:38

## 2018-09-02 RX ADMIN — HYDROCODONE BITARTRATE AND ACETAMINOPHEN 2 TABLET: 5; 325 TABLET ORAL at 23:13

## 2018-09-02 RX ADMIN — FENTANYL CITRATE 100 MCG: 50 INJECTION, SOLUTION INTRAMUSCULAR; INTRAVENOUS at 19:21

## 2018-09-02 RX ADMIN — DEXAMETHASONE SODIUM PHOSPHATE 8 MG: 4 INJECTION, SOLUTION INTRA-ARTICULAR; INTRALESIONAL; INTRAMUSCULAR; INTRAVENOUS; SOFT TISSUE at 19:41

## 2018-09-02 RX ADMIN — NEOSTIGMINE METHYLSULFATE 3 MG: 1 INJECTION INTRAVENOUS at 20:17

## 2018-09-02 RX ADMIN — GLYCOPYRROLATE 0.4 MG: 0.2 INJECTION INTRAMUSCULAR; INTRAVENOUS at 20:17

## 2018-09-02 RX ADMIN — LIDOCAINE HYDROCHLORIDE 80 MG: 20 INJECTION, SOLUTION EPIDURAL; INFILTRATION; INTRACAUDAL; PERINEURAL at 19:21

## 2018-09-02 RX ADMIN — Medication 10 ML: at 08:06

## 2018-09-02 RX ADMIN — ONDANSETRON 4 MG: 2 INJECTION INTRAMUSCULAR; INTRAVENOUS at 19:41

## 2018-09-02 RX ADMIN — MIDAZOLAM HYDROCHLORIDE 2 MG: 1 INJECTION, SOLUTION INTRAMUSCULAR; INTRAVENOUS at 19:12

## 2018-09-02 RX ADMIN — PROPOFOL 160 MG: 10 INJECTION, EMULSION INTRAVENOUS at 19:21

## 2018-09-02 RX ADMIN — SODIUM CHLORIDE, SODIUM LACTATE, POTASSIUM CHLORIDE, AND CALCIUM CHLORIDE 25 ML/HR: 600; 310; 30; 20 INJECTION, SOLUTION INTRAVENOUS at 19:01

## 2018-09-02 RX ADMIN — ROCURONIUM BROMIDE 30 MG: 10 INJECTION, SOLUTION INTRAVENOUS at 19:33

## 2018-09-02 RX ADMIN — Medication 80 MCG: at 19:48

## 2018-09-02 RX ADMIN — POTASSIUM CHLORIDE 10 MEQ: 10 INJECTION, SOLUTION INTRAVENOUS at 08:03

## 2018-09-02 RX ADMIN — ASPIRIN 325 MG: 325 TABLET ORAL at 08:05

## 2018-09-02 NOTE — PROGRESS NOTES
Uneventful shift. Had continuous high bp, treated with metropolol.  CHG prep and linen change completed for procedure.  
-Edel Meade RN

## 2018-09-02 NOTE — PROGRESS NOTES
Hospitalist Progress Note NAME: Ann Mendieta :  1950 MRN:  [id number] Assessment / Plan: Hypertension, benign/essential: - IV metoprolol while NPO (on oral lopressor outpatient) - prn nitrobid Hyperlipidemia: restart statin when able to take po  
SBO due to ventral wall hernia: Accidentally removed NG tube evening after admission 
- CT A/P with small bowel obstruction due to abdominal wall hernia 
- see initial consult regarding cardiac clearance if needed - Pt remains NPO for possible surgical repair today 
- further mgmt per primary team 
Obesity Body mass index is 38.11 kg/(m^2) 
   
Code Status: Full DVT Prophylaxis: heparin Subjective: Chief Complaint / Reason for Physician Visit \"I feel about the same\". Discussed with RN events overnight. Review of Systems: 
Symptom Y/N Comments  Symptom Y/N Comments Fever/Chills n   Chest Pain n   
Poor Appetite n   Edema n   
Cough n   Abdominal Pain n   
Sputum n   Joint Pain SOB/FLETCHER n   Pruritis/Rash Nausea/vomit    Tolerating PT/OT Diarrhea    Tolerating Diet Constipation    Other Could NOT obtain due to:   
 
Objective: VITALS:  
Last 24hrs VS reviewed since prior progress note. Most recent are: 
Patient Vitals for the past 24 hrs: 
 Temp Pulse Resp BP SpO2  
18 0806 98.7 °F (37.1 °C) 83 16 (!) 137/93 94 % 18 0425 98.6 °F (37 °C) 96 16 (!) 135/91 98 % 18 2300 98.5 °F (36.9 °C) 94 16 (!) 147/93 96 % 18 2000 98.1 °F (36.7 °C) 91 16 (!) 155/95 94 % 18 1638 98.4 °F (36.9 °C) 87 17 (!) 158/102 95 % 18 1155 98.2 °F (36.8 °C) 87 16 (!) 143/94 95 % Intake/Output Summary (Last 24 hours) at 18 1043 Last data filed at 18 0300 Gross per 24 hour Intake                0 ml Output              700 ml Net             -700 ml PHYSICAL EXAM: 
General: Obese.  Alert, cooperative, no acute distress   
 EENT:  EOMI. Anicteric sclerae. MMM Resp:  CTA bilaterally, no wheezing or rales. No accessory muscle use CV:  Regular  rhythm,  No edema GI:  Mildly firm, mildly distended, Non tender. Diminished bowel sounds. Neurologic:  Alert and oriented X 3, normal speech, Psych:   Good insight. Not anxious nor agitated Skin:  Mild anterior abd firmness and warmth. No rashes. No jaundice Reviewed most current lab test results and cultures  YES Reviewed most current radiology test results   YES Review and summation of old records today    NO Reviewed patient's current orders and MAR    YES 
PMH/SH reviewed - no change compared to H&P 
________________________________________________________________________ Care Plan discussed with: 
  Comments Patient x Family RN x Care Manager Consultant  x surgery Multidiciplinary team rounds were held today with , nursing, pharmacist and clinical coordinator. Patient's plan of care was discussed; medications were reviewed and discharge planning was addressed. ________________________________________________________________________ Total NON critical care TIME:  25 Minutes Total CRITICAL CARE TIME Spent:   Minutes non procedure based Comments >50% of visit spent in counseling and coordination of care x   
________________________________________________________________________ Ava Santos MD  
 
Procedures: see electronic medical records for all procedures/Xrays and details which were not copied into this note but were reviewed prior to creation of Plan. LABS: 
I reviewed today's most current labs and imaging studies. Pertinent labs include: 
Recent Labs  
   09/02/18 
 0423  08/30/18 
 1246 WBC  8.8  7.3 HGB  13.8  13.5 HCT  41.8  40.1 PLT  256  214 Recent Labs  
   09/02/18 
 0423  08/30/18 
 1246 NA  140  138  
K  3.3*  3.4*  
CL  104  103 CO2  26  27 GLU  114*  124* BUN  7  14 CREA  0.84  0.88 CA  9.2  8.5 ALB   --   3.3* TBILI   --   1.2* SGOT   --   20 ALT   --   36 Signed: Sujata Alcocer MD

## 2018-09-02 NOTE — PROGRESS NOTES
Nursing Communication Tool 7:18 PM 
9/2/2018 Bedside shift change report given to Trinity Loya RN (incoming nurse) by Chiara Ambriz (outgoing nurse) on Randene Shantell. Report included the following information SBAR, Kardex, MAR and Recent Results. Shift Summary: Patient rested comfortably throughout shift. No complaints of nausea or pain Issues for physician to address:     
 
 
 Shift Note Admission Date 8/29/2018 Admission Diagnosis SBO (small bowel obstruction) (HCC) 
incarcerated ventral hernia, possible sbo Code Status Full Code Consults IP CONSULT TO GENERAL SURGERY 
IP CONSULT TO HOSPITALIST Cardiac Monitoring [] Yes [] No  
  
Purposeful Hourly Rounding [] Yes   
Fran Score Total Score: 1 Fran score 3 or > [] Bed Alarm [] Avasys [] 1:1 sitter [] Patient refused (Place signed refusal form in chart) Pain Managed [] Yes [] No  
 Key Pain Meds   
    
  
 aspirin (ASPIRIN) 325 mg tablet  (Taking) Take 325 mg by mouth daily. Influenza Vaccine Received Flu Vaccine for Current Season (usually Sept-March): Not Flu Season Oxygen needs? [] Room air Oxygen @  []1L    []2L    []3L   []4L    []5L   []6L Use home O2? [] Yes [] No 
Perform O2 challenge test using  smartphrase (.oxygenchallenge) Last bowel movement Last Bowel Movement Date: 08/29/18 
bowel movement Urinary Catheter LDAs Peripheral IV 09/01/18 Right Wrist (Active) Site Assessment Clean, dry, & intact 9/2/2018  6:57 PM  
Phlebitis Assessment 0 9/2/2018  6:57 PM  
Infiltration Assessment 0 9/2/2018  6:57 PM  
Dressing Status Clean, dry, & intact 9/2/2018  6:57 PM  
Dressing Type Tape;Transparent 9/2/2018  6:57 PM  
Hub Color/Line Status Patent; Flushed; Infusing 9/2/2018  6:57 PM  
                  
  
Readmission Risk Assessment Tool Score Low Risk 6 Total Score   
  
 5 Pt. Coverage (Medicare=5 , Medicaid, or Self-Pay=4) 6 Charlson Comorbidity Score (Age + Comorbid Conditions) Criteria that do not apply:  
 Has Seen PCP in Last 6 Months (Yes=3, No=0) . Living with Significant Other. Assisted Living. LTAC. SNF. or  
Rehab Patient Length of Stay (>5 days = 3) IP Visits Last 12 Months (1-3=4, 4=9, >4=11) Expected Length of Stay 2d 9h  
Actual Length of Stay 3 Trae Booker

## 2018-09-02 NOTE — PERIOP NOTES
TRANSFER - IN REPORT: 
 
Verbal report received from Elvira on Jacquie Ashley  being received from 2138(unit) for routine post - op Report consisted of patients Situation, Background, Assessment and  
Recommendations(SBAR). Information from the following report(s) SBAR and MAR was reviewed with the receiving nurse. Opportunity for questions and clarification was provided. Assessment completed upon patients arrival to unit and care assumed.

## 2018-09-02 NOTE — ANESTHESIA PREPROCEDURE EVALUATION
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 
  
GI/Hepatic/Renal 
  
 
 
 
 
 
Comments: Incarcerated ventral hernia Possible Small Bowel Obstruction Endo/Other Obesity and arthritis Other Findings Physical Exam 
 
Airway Mallampati: II 
TM Distance: > 6 cm Neck ROM: normal range of motion Mouth opening: Normal 
 
 Cardiovascular Regular rate and rhythm,  S1 and S2 normal,  no murmur, click, rub, or gallop Dental 
 
 
Comments: Several missing teeth, none loose Pulmonary Breath sounds clear to auscultation Abdominal 
GI exam deferred Other Findings Anesthetic Plan ASA: 3 Anesthesia type: general 
 
 
 
 
Induction: Intravenous Anesthetic plan and risks discussed with: Patient

## 2018-09-03 LAB
ANION GAP SERPL CALC-SCNC: 7 MMOL/L (ref 5–15)
BASOPHILS # BLD: 0 K/UL (ref 0–0.1)
BASOPHILS NFR BLD: 0 % (ref 0–1)
BUN SERPL-MCNC: 10 MG/DL (ref 6–20)
BUN/CREAT SERPL: 14 (ref 12–20)
CALCIUM SERPL-MCNC: 8.7 MG/DL (ref 8.5–10.1)
CHLORIDE SERPL-SCNC: 105 MMOL/L (ref 97–108)
CO2 SERPL-SCNC: 27 MMOL/L (ref 21–32)
CREAT SERPL-MCNC: 0.72 MG/DL (ref 0.55–1.02)
DIFFERENTIAL METHOD BLD: ABNORMAL
EOSINOPHIL # BLD: 0 K/UL (ref 0–0.4)
EOSINOPHIL NFR BLD: 0 % (ref 0–7)
ERYTHROCYTE [DISTWIDTH] IN BLOOD BY AUTOMATED COUNT: 13.1 % (ref 11.5–14.5)
GLUCOSE SERPL-MCNC: 130 MG/DL (ref 65–100)
HCT VFR BLD AUTO: 38.9 % (ref 35–47)
HGB BLD-MCNC: 12.7 G/DL (ref 11.5–16)
IMM GRANULOCYTES # BLD: 0.1 K/UL (ref 0–0.04)
IMM GRANULOCYTES NFR BLD AUTO: 1 % (ref 0–0.5)
LYMPHOCYTES # BLD: 0.7 K/UL (ref 0.8–3.5)
LYMPHOCYTES NFR BLD: 8 % (ref 12–49)
MAGNESIUM SERPL-MCNC: 2.2 MG/DL (ref 1.6–2.4)
MCH RBC QN AUTO: 29.3 PG (ref 26–34)
MCHC RBC AUTO-ENTMCNC: 32.6 G/DL (ref 30–36.5)
MCV RBC AUTO: 89.8 FL (ref 80–99)
MONOCYTES # BLD: 0.3 K/UL (ref 0–1)
MONOCYTES NFR BLD: 4 % (ref 5–13)
NEUTS SEG # BLD: 7.4 K/UL (ref 1.8–8)
NEUTS SEG NFR BLD: 87 % (ref 32–75)
NRBC # BLD: 0 K/UL (ref 0–0.01)
NRBC BLD-RTO: 0 PER 100 WBC
PHOSPHATE SERPL-MCNC: 4.2 MG/DL (ref 2.6–4.7)
PLATELET # BLD AUTO: 227 K/UL (ref 150–400)
PMV BLD AUTO: 9.8 FL (ref 8.9–12.9)
POTASSIUM SERPL-SCNC: 4 MMOL/L (ref 3.5–5.1)
RBC # BLD AUTO: 4.33 M/UL (ref 3.8–5.2)
SODIUM SERPL-SCNC: 139 MMOL/L (ref 136–145)
WBC # BLD AUTO: 8.4 K/UL (ref 3.6–11)

## 2018-09-03 PROCEDURE — 77010033678 HC OXYGEN DAILY

## 2018-09-03 PROCEDURE — 65270000029 HC RM PRIVATE

## 2018-09-03 PROCEDURE — 74011250636 HC RX REV CODE- 250/636: Performed by: FAMILY MEDICINE

## 2018-09-03 PROCEDURE — 85025 COMPLETE CBC W/AUTO DIFF WBC: CPT | Performed by: FAMILY MEDICINE

## 2018-09-03 PROCEDURE — 74011250637 HC RX REV CODE- 250/637: Performed by: FAMILY MEDICINE

## 2018-09-03 PROCEDURE — 74011000250 HC RX REV CODE- 250: Performed by: INTERNAL MEDICINE

## 2018-09-03 PROCEDURE — 84100 ASSAY OF PHOSPHORUS: CPT | Performed by: FAMILY MEDICINE

## 2018-09-03 PROCEDURE — 36415 COLL VENOUS BLD VENIPUNCTURE: CPT | Performed by: FAMILY MEDICINE

## 2018-09-03 PROCEDURE — 74011250636 HC RX REV CODE- 250/636: Performed by: INTERNAL MEDICINE

## 2018-09-03 PROCEDURE — 74011250637 HC RX REV CODE- 250/637: Performed by: INTERNAL MEDICINE

## 2018-09-03 PROCEDURE — 83735 ASSAY OF MAGNESIUM: CPT | Performed by: FAMILY MEDICINE

## 2018-09-03 PROCEDURE — 80048 BASIC METABOLIC PNL TOTAL CA: CPT | Performed by: FAMILY MEDICINE

## 2018-09-03 RX ORDER — METOPROLOL TARTRATE 25 MG/1
25 TABLET, FILM COATED ORAL 2 TIMES DAILY
Status: DISCONTINUED | OUTPATIENT
Start: 2018-09-03 | End: 2018-09-04 | Stop reason: HOSPADM

## 2018-09-03 RX ADMIN — HYDROCODONE BITARTRATE AND ACETAMINOPHEN 2 TABLET: 5; 325 TABLET ORAL at 03:10

## 2018-09-03 RX ADMIN — Medication 10 ML: at 05:31

## 2018-09-03 RX ADMIN — HYDROMORPHONE HYDROCHLORIDE 1 MG: 2 INJECTION, SOLUTION INTRAMUSCULAR; INTRAVENOUS; SUBCUTANEOUS at 16:36

## 2018-09-03 RX ADMIN — Medication 10 ML: at 16:37

## 2018-09-03 RX ADMIN — METOPROLOL TARTRATE 25 MG: 25 TABLET ORAL at 17:49

## 2018-09-03 RX ADMIN — ENOXAPARIN SODIUM 40 MG: 40 INJECTION, SOLUTION INTRAVENOUS; SUBCUTANEOUS at 10:09

## 2018-09-03 RX ADMIN — Medication 10 ML: at 21:40

## 2018-09-03 RX ADMIN — Medication 10 ML: at 21:41

## 2018-09-03 RX ADMIN — METOPROLOL TARTRATE 5 MG: 1 INJECTION, SOLUTION INTRAVENOUS at 05:30

## 2018-09-03 RX ADMIN — Medication 2 G: at 03:10

## 2018-09-03 RX ADMIN — PRAVASTATIN SODIUM 40 MG: 40 TABLET ORAL at 21:40

## 2018-09-03 RX ADMIN — Medication 2 G: at 12:22

## 2018-09-03 RX ADMIN — HYDROCODONE BITARTRATE AND ACETAMINOPHEN 2 TABLET: 5; 325 TABLET ORAL at 22:19

## 2018-09-03 RX ADMIN — METOPROLOL TARTRATE 25 MG: 25 TABLET ORAL at 10:09

## 2018-09-03 RX ADMIN — ASPIRIN 325 MG: 325 TABLET ORAL at 10:09

## 2018-09-03 RX ADMIN — HYDROMORPHONE HYDROCHLORIDE 1 MG: 2 INJECTION, SOLUTION INTRAMUSCULAR; INTRAVENOUS; SUBCUTANEOUS at 05:31

## 2018-09-03 RX ADMIN — DEXTROSE MONOHYDRATE, SODIUM CHLORIDE, AND POTASSIUM CHLORIDE 50 ML/HR: 50; 4.5; .745 INJECTION, SOLUTION INTRAVENOUS at 03:10

## 2018-09-03 NOTE — PERIOP NOTES
Handoff Report from Operating Room to PACU Report received from Adrianna Smith RN  and Lizzy Beltran CRNA  regarding Blanca Shown. Surgeon(s): 
Army Whitney MD  And Procedure(s) (LRB): 
INCARCERATED INCISIONAL HERNIA REPAIR WITH MESH (N/A)  confirmed  
with dressings discussed. Anesthesia type, drugs, patient history, complications, estimated blood loss, vital signs, intake and output, and last pain medication and reversal medications were reviewed.

## 2018-09-03 NOTE — PROGRESS NOTES
Admit Date: 2018 POD 1 Day Post-Op Procedure:  Procedure(s): 
INCARCERATED INCISIONAL HERNIA REPAIR WITH MESH Subjective:  
 
Patient has no complaints. No flatus or BM postop yet. Jaime clears well. Objective:  
 
Blood pressure (!) 127/91, pulse 72, temperature 97.7 °F (36.5 °C), resp. rate 16, height 5' 6\" (1.676 m), weight 236 lb 1.8 oz (107.1 kg), SpO2 97 %. Temp (24hrs), Av °F (36.7 °C), Min:97.4 °F (36.3 °C), Max:98.6 °F (37 °C) Physical Exam:  GENERAL: alert, cooperative, no distress, appears stated age, LUNG: clear to auscultation bilaterally, HEART: regular rate and rhythm, ABDOMEN: soft, non-tender. Bowel sounds normal. No masses,  no organomegaly, wound c/d/i, EXTREMITIES:  extremities normal, atraumatic, no cyanosis or edema Labs:  
Recent Results (from the past 24 hour(s)) METABOLIC PANEL, BASIC Collection Time: 18  3:05 AM  
Result Value Ref Range Sodium 139 136 - 145 mmol/L Potassium 4.0 3.5 - 5.1 mmol/L Chloride 105 97 - 108 mmol/L  
 CO2 27 21 - 32 mmol/L Anion gap 7 5 - 15 mmol/L Glucose 130 (H) 65 - 100 mg/dL BUN 10 6 - 20 MG/DL Creatinine 0.72 0.55 - 1.02 MG/DL  
 BUN/Creatinine ratio 14 12 - 20 GFR est AA >60 >60 ml/min/1.73m2 GFR est non-AA >60 >60 ml/min/1.73m2 Calcium 8.7 8.5 - 10.1 MG/DL  
CBC WITH AUTOMATED DIFF Collection Time: 18  3:05 AM  
Result Value Ref Range WBC 8.4 3.6 - 11.0 K/uL  
 RBC 4.33 3.80 - 5.20 M/uL  
 HGB 12.7 11.5 - 16.0 g/dL HCT 38.9 35.0 - 47.0 % MCV 89.8 80.0 - 99.0 FL  
 MCH 29.3 26.0 - 34.0 PG  
 MCHC 32.6 30.0 - 36.5 g/dL  
 RDW 13.1 11.5 - 14.5 % PLATELET 537 659 - 294 K/uL MPV 9.8 8.9 - 12.9 FL  
 NRBC 0.0 0  WBC ABSOLUTE NRBC 0.00 0.00 - 0.01 K/uL NEUTROPHILS 87 (H) 32 - 75 % LYMPHOCYTES 8 (L) 12 - 49 % MONOCYTES 4 (L) 5 - 13 % EOSINOPHILS 0 0 - 7 % BASOPHILS 0 0 - 1 % IMMATURE GRANULOCYTES 1 (H) 0.0 - 0.5 % ABS. NEUTROPHILS 7.4 1.8 - 8.0 K/UL  
 ABS. LYMPHOCYTES 0.7 (L) 0.8 - 3.5 K/UL  
 ABS. MONOCYTES 0.3 0.0 - 1.0 K/UL  
 ABS. EOSINOPHILS 0.0 0.0 - 0.4 K/UL  
 ABS. BASOPHILS 0.0 0.0 - 0.1 K/UL  
 ABS. IMM. GRANS. 0.1 (H) 0.00 - 0.04 K/UL  
 DF AUTOMATED MAGNESIUM Collection Time: 09/03/18  3:05 AM  
Result Value Ref Range Magnesium 2.2 1.6 - 2.4 mg/dL PHOSPHORUS Collection Time: 09/03/18  3:05 AM  
Result Value Ref Range Phosphorus 4.2 2.6 - 4.7 MG/DL Data Review images and reports reviewed Assessment:  
 
Active Problems: 
  SBO (small bowel obstruction) ([facility] [address] (8/30/2018) Plan/Recommendations/Medical Decision Making:  
 
Continue present treatment GI lite diet Likely home tomorrow Deidre Gaitan. Trev Wilson MD, [facility] [address] [facility]

## 2018-09-03 NOTE — ROUTINE PROCESS
Patient: Cyn Grayson MRN: [id number]  SSN: [id number] YOB: 1950  Age: 74 y.o. Sex: female Patient is status post Procedure(s): 
INCARCERATED INCISIONAL HERNIA REPAIR WITH MESH. Surgeon(s) and Role: Terrye Dubin, MD - Primary Peripheral IV 09/01/18 Right Wrist (Active) Site Assessment Clean, dry, & intact 9/2/2018  6:57 PM  
Phlebitis Assessment 0 9/2/2018  6:57 PM  
Infiltration Assessment 0 9/2/2018  6:57 PM  
Dressing Status Clean, dry, & intact 9/2/2018  6:57 PM  
Dressing Type Tape;Transparent 9/2/2018  6:57 PM  
Hub Color/Line Status Patent; Flushed; Infusing 9/2/2018  6:57 PM  
        
Airway - Endotracheal Tube 09/02/18 Oral (Active) Dressing/Packing:  Wound Abdomen-DRESSING TYPE: Staples; Other (Comment) (HONEYCOMB) (09/02/18 2015) Splint/Cast:  ]

## 2018-09-03 NOTE — ANESTHESIA POSTPROCEDURE EVALUATION
Post-Anesthesia Evaluation and Assessment Patient: Maximino Casillas MRN: [id number]  SSN: [id number] YOB: 1950  Age: 74 y.o. Sex: female Cardiovascular Function/Vital Signs Visit Vitals  /81 (BP 1 Location: Left arm, BP Patient Position: At rest)  Pulse 91  Temp 36.9 °C (98.5 °F)  Resp 17  Ht 5' 6\" (1.676 m)  Wt 107.1 kg (236 lb 1.8 oz)  SpO2 96%  BMI 38.11 kg/m2 Patient is status post general anesthesia for Procedure(s): 
[place]. Nausea/Vomiting: None Postoperative hydration reviewed and adequate. Pain: 
Pain Scale 1: Numeric (0 - 10) (09/02/18 2100) Pain Intensity 1: 0 (09/02/18 2100) Managed Neurological Status:  
Neuro (WDL): Within Defined Limits (09/02/18 2100) Neuro Neurologic State: Drowsy (09/02/18 2027) Orientation Level: Oriented X4 (09/02/18 2027) Cognition: Follows commands;Decreased attention/concentration (09/02/18 2027) LUE Motor Response: Purposeful (09/02/18 2027) LLE Motor Response: Purposeful (09/02/18 2027) RUE Motor Response: Purposeful (09/02/18 2027) RLE Motor Response: Purposeful (09/02/18 2027) At baseline Mental Status and Level of Consciousness: Arousable Pulmonary Status:  
O2 Device: Room air (09/02/18 2100) Adequate oxygenation and airway patent Complications related to anesthesia: None Post-anesthesia assessment completed. No concerns Signed By: Christos Staton MD   
 September 2, 2018

## 2018-09-03 NOTE — BRIEF OP NOTE
BRIEF OPERATIVE NOTE Date of Procedure: 9/2/2018 Preoperative Diagnosis: incarcerated ventral hernia, causing sbo Postoperative Diagnosis: isame Procedure(s): 
INCARCERATED INCISIONAL HERNIA REPAIR WITH MESH Surgeon(s) and Role: Lenny Rosen MD - Primary Surgical Assistant:none Surgical Staff: 
Circ-1: Gisela Gutierrez RN Scrub Tech-1: Ngoc Gould Surg Asst-1: Maria Guadalupe Guerrero Event Time In Incision Start 7878 Incision Close 2014 Anesthesia: General  
Estimated Blood Loss: min Specimens: * No specimens in log * Findings:after anesth reduces easily, 4 cm defect, repaired with 6.4 diameter circular composite mesh Complications: none Implants:  
Implant Name Type Inv. Item Serial No.  Lot No. LRB No. Used Action MESH VENTRALEX [address] MED --  - SN/A   MESH VENTRALEX ST MED --  N/A BARD DAVOL [id number] N/A 1 Implanted Hjonny Clipper. [employer] [employer], [address] [facility]

## 2018-09-03 NOTE — ROUTINE PROCESS
Bedside shift change report given to Anna (oncoming nurse) by Sarah Pond (offgoing nurse). Report included the following information SBAR, Kardex, Intake/Output and MAR.

## 2018-09-03 NOTE — PROGRESS NOTES
Hospitalist Progress Note NAME: Esmond Jeans :  1950 MRN:  [id number] Assessment / Plan: Hypertension, benign/essential:  
- restart home metoprolol po 
- d/c telemetry monitoring - prn nitrobid Hyperlipidemia: restart statin today SBO due to ventral wall hernia: now s/p incarcerated ventral hernia repair with mesh  
- CT A/P on admission with small bowel obstruction due to abdominal wall hernia 
- advancing diet today 
- further mgmt per primary team 
Obesity Body mass index is 38.11 kg/(m^2) Pt at baseline for medical issues with anticipated discharge tomorrow so I will sign off. Please reconsult if additional questions. 
   
Code Status: Full DVT Prophylaxis: heparin Subjective: Chief Complaint / Reason for Physician Visit \"Feeling better\". Excited to try solid food today. Discussed with RN events overnight. Review of Systems: 
Symptom Y/N Comments  Symptom Y/N Comments Fever/Chills n   Chest Pain n   
Poor Appetite n   Edema n   
Cough n   Abdominal Pain y Sputum n   Joint Pain SOB/FLETCHER n   Pruritis/Rash Nausea/vomit    Tolerating PT/OT Diarrhea    Tolerating Diet Constipation    Other Could NOT obtain due to:   
 
Objective: VITALS:  
Last 24hrs VS reviewed since prior progress note. Most recent are: 
Patient Vitals for the past 24 hrs: 
 Temp Pulse Resp BP SpO2  
18 0736 97.7 °F (36.5 °C) 72 16 (!) 127/91 97 % 18 0530 - 80 - (!) 149/99 -  
18 0312 97.6 °F (36.4 °C) 83 16 131/84 97 % 18 0232 - 74 15 114/69 96 % 18 0134 97.8 °F (36.6 °C) 85 17 109/73 92 % 18 0036 97.6 °F (36.4 °C) 88 17 122/78 94 % 18 0006 97.8 °F (36.6 °C) 92 16 121/83 95 % 18 2336 - 83 16 134/78 94 % 18 2306 97.6 °F (36.4 °C) 92 16 129/76 95 % 18 2231 97.6 °F (36.4 °C) 85 16 140/85 93 % 18 2158 97.4 °F (36.3 °C) 96 17 (!) 146/98 93 % 09/02/18 2126 98.1 °F (36.7 °C) 86 18 156/88 96 % 09/02/18 2100 98.5 °F (36.9 °C) 91 17 147/81 96 % 09/02/18 2045 98.5 °F (36.9 °C) 94 18 144/80 96 % 09/02/18 2040 - 92 17 148/88 97 % 09/02/18 2035 - 98 - (!) 145/98 95 % 09/02/18 2030 - 97 - 146/80 94 % 09/02/18 2028 98.6 °F (37 °C) 97 12 144/58 93 % 09/02/18 2027 98.6 °F (37 °C) 97 - 144/58 93 % 09/02/18 1843 98.2 °F (36.8 °C) 87 16 138/82 94 % 09/02/18 1521 98.4 °F (36.9 °C) 87 16 123/85 95 % 09/02/18 1218 98.5 °F (36.9 °C) 75 16 (!) 127/91 96 % Intake/Output Summary (Last 24 hours) at 09/03/18 1102 Last data filed at 09/03/18 5198 Gross per 24 hour Intake          1684.16 ml Output              410 ml Net          1274.16 ml PHYSICAL EXAM: 
General: Obese. Alert, cooperative, no acute distress   
EENT:  EOMI. Anicteric sclerae. MMM Resp:  CTA bilaterally, no wheezing or rales. No accessory muscle use CV:  Regular  rhythm,  No edema GI:  Soft, Non distended, Non tender.  +Bowel sounds Neurologic:  Alert and oriented X 3, normal speech, Psych:   Good insight. Not anxious nor agitated Skin:  No rashes. No jaundice. Staples intact without erythema. Reviewed most current lab test results and cultures  YES Reviewed most current radiology test results   YES Review and summation of old records today    NO Reviewed patient's current orders and MAR    YES 
PMH/SH reviewed - no change compared to H&P 
________________________________________________________________________ Care Plan discussed with: 
  Comments Patient x Family RN Care Manager Consultant  x surgery Multidiciplinary team rounds were held today with , nursing, pharmacist and clinical coordinator. Patient's plan of care was discussed; medications were reviewed and discharge planning was addressed. ________________________________________________________________________ Total NON critical care TIME:  25 Minutes Total CRITICAL CARE TIME Spent:   Minutes non procedure based Comments >50% of visit spent in counseling and coordination of care x   
________________________________________________________________________ Liliana uBtler MD  
 
Procedures: see electronic medical records for all procedures/Xrays and details which were not copied into this note but were reviewed prior to creation of Plan. LABS: 
I reviewed today's most current labs and imaging studies. Pertinent labs include: 
Recent Labs  
   09/03/18 0305 09/02/18 0423 WBC  8.4  8.8 HGB  12.7  13.8 HCT  38.9  41.8 PLT  227  256 Recent Labs  
   09/03/18 0305 09/02/18 0423 NA  139  140  
K  4.0  3.3*  
CL  105  104 CO2  27  26 GLU  130*  114* BUN  10  7 CREA  0.72  0.84 CA  8.7  9.2 MG  2.2   --   
PHOS  4.2   --   
 
 
Signed: Liliana Butler MD

## 2018-09-03 NOTE — PROGRESS NOTES
2100: TRANSFER - IN REPORT: 
 
Verbal report received from Casanova Oil (name) on Roslyn Daniel  being received from PACU (unit) for routine progression of care Report consisted of patients Situation, Background, Assessment and  
Recommendations(SBAR). Information from the following report(s) SBAR was reviewed with the receiving nurse. Opportunity for questions and clarification was provided. Assessment completed upon patients arrival to unit and care assumed. Patient has not yet arrived. Note to follow when she arrives. Patient will be in my care until 0300. 
 
2123: Patient arrived to the floor. Safety teaching done. Bed alarm on. Call bell within reach. [phone]: Primary Nurse Leonardo Aquino, RAFFY and Sonido Asif RN performed a dual skin assessment on this patient Impairment noted- midline surgical incision to abdomen. 0023: Paged Hospitalist about scheduled metoprolol, with /83, pulse 92. 
 
0028: Orders received from Dr. Elian Perera to hold metoprolol for the 0000 dose.

## 2018-09-03 NOTE — PERIOP NOTES
TRANSFER - OUT REPORT: 
 
Verbal report given to Stacie Duke RN (name) on Jesus Parkinson  being transferred to 213(unit) for routine post - op Report consisted of patients Situation, Background, Assessment and  
Recommendations(SBAR). Information from the following report(s) SBAR, Kardex, OR Summary, Intake/Output and MAR was reviewed with the receiving nurse. Opportunity for questions and clarification was provided. Patient transported with: 
 Monitor O2 @ 2 liters Registered Nurse

## 2018-09-03 NOTE — OP NOTES
[address]  OPERATIVE REPORT    Mesha Richey  MR#: [id number]  : 1950  ACCOUNT #: [de-identified]   DATE OF SERVICE: 2018    PREOPERATIVE DIAGNOSIS:  Ventral hernia causing small-bowel obstruction. POSTOPERATIVE DIAGNOSIS: Ventral hernia causing small-bowel obstruction. PROCEDURE PERFORMED:  Repair of ventral hernia. SURGEON:  Prince Novak MD    ANESTHESIA:  General endotracheal tube. ASSISTANT:  None. ESTIMATED BLOOD LOSS:  Minimal.    SPECIMENS REMOVED:  None. COMPLICATIONS:  None. IMPLANTS:  This is a mesh Ventralex medium sized patch used for repair made by [employer] lot number [id number]. INDICATION FOR PROCEDURE:  This is a 44-year-old female who has a ventral hernia which has small bowel within it which is causing a small-bowel obstruction. The hernia can be reduced intermittently, but immediately comes back and continues to result in small-bowel obstruction. She therefore comes for repair. FINDINGS:  At the time of surgery, the patient is found to have a ventral hernia, which easily reduces after anesthesia. Contents are viable. The fascial defect measures 4 cm and this is repaired using a 6.4 cm in diameter circular Ventralex patch. PROCEDURE:  With the patient in the supine position under adequate general anesthesia, the abdomen was prepped and draped in the usual fashion. After an appropriate time out, an incision is made overlying the palpable ventral hernia. This incision is carried down through the skin and subcutaneous tissues to the hernia sac. At this point in time,  retractors were placed and the hernia sac is dissected away from the surrounding subcutaneous tissues down to the level of the underlying fascia.   The hernia sac is then reduced into this defect reducing all of the abdominal contents as well and dissection is performed along the edge of the fascial defect to free the sac up for approximately a cm beyond the edges.  Following this, the defect is measured and found to be approximately 4 cm in diameter, so a 6.4 cm diameter circular patch is obtained. 4 initial sutures are placed through the fascia in each quadrant and then these are sutured to the ring of the mesh and then the mesh is placed into the defect and these sutures are brought back out through the fascia and tied down. This secures our mesh in place initially. Then, between each of these sutures, an additional suture is placed in a similar fashion, tacking the mesh through the ring to the fascia. The fascia is then closed using a running PDS which is placed such that it not only catches the fascia on each side of our defect, but also catches a portion of the mesh where the strap is present. The strap is then removed and the fascia approximation is completed. Following this, several 3-0 Vicryl sutures were used to approximate the subcutaneous tissues and close the dead space. The skin is then approximated using skin clips and a sterile dressing is applied to the site. The patient was transferred to the recovery area in stable condition having tolerated the procedure with needle, sponge and instrument counts being reported as correct.       MD MILIND Lees / [place]  D: 09/02/2018 20:25     T: 09/02/2018 21:15  JOB #: [id number]

## 2018-09-04 VITALS
OXYGEN SATURATION: 97 % | HEIGHT: 66 IN | RESPIRATION RATE: 18 BRPM | SYSTOLIC BLOOD PRESSURE: 123 MMHG | HEART RATE: 86 BPM | BODY MASS INDEX: 37.95 KG/M2 | DIASTOLIC BLOOD PRESSURE: 94 MMHG | WEIGHT: 236.11 LBS | TEMPERATURE: 97.7 F

## 2018-09-04 PROCEDURE — 74011250637 HC RX REV CODE- 250/637: Performed by: INTERNAL MEDICINE

## 2018-09-04 PROCEDURE — 74011250637 HC RX REV CODE- 250/637: Performed by: FAMILY MEDICINE

## 2018-09-04 PROCEDURE — 74011250636 HC RX REV CODE- 250/636: Performed by: FAMILY MEDICINE

## 2018-09-04 RX ORDER — HYDROCODONE BITARTRATE AND ACETAMINOPHEN 5; 325 MG/1; MG/1
1-2 TABLET ORAL
Qty: 20 TAB | Refills: 0 | Status: SHIPPED | OUTPATIENT
Start: 2018-09-04

## 2018-09-04 RX ADMIN — HYDROCODONE BITARTRATE AND ACETAMINOPHEN 2 TABLET: 5; 325 TABLET ORAL at 06:23

## 2018-09-04 RX ADMIN — METOPROLOL TARTRATE 25 MG: 25 TABLET ORAL at 08:22

## 2018-09-04 RX ADMIN — ENOXAPARIN SODIUM 40 MG: 40 INJECTION, SOLUTION INTRAVENOUS; SUBCUTANEOUS at 08:23

## 2018-09-04 RX ADMIN — Medication 10 ML: at 06:25

## 2018-09-04 RX ADMIN — ASPIRIN 325 MG: 325 TABLET ORAL at 08:23

## 2018-09-04 NOTE — DISCHARGE SUMMARY
Physician Discharge Summary     Patient ID:  Royce Kathleen  [id number]  74 y.o.  1950    Admit Date: 8/29/2018    Discharge Date: 9/4/2018    Admission Diagnoses: SBO (small bowel obstruction) (HCC);incarcerated ventral he*    Discharge Diagnoses: Active Problems:    SBO (small bowel obstruction) ([address] (8/30/2018)         Admission Condition: Poor    Discharge Condition: Good    Last Procedure: Procedure(s):  [place] Course:   Normal hospital course for this procedure. Consults: None and Hospitalist    Disposition: home    Patient Instructions:   Current Discharge Medication List      START taking these medications    Details   HYDROcodone-acetaminophen (NORCO) 5-325 mg per tablet Take 1-2 Tabs by mouth every four (4) hours as needed. Max Daily Amount: 12 Tabs. Qty: 20 Tab, Refills: 0    Associated Diagnoses: Incisional ventral hernia w obstruction         CONTINUE these medications which have NOT CHANGED    Details   aspirin (ASPIRIN) 325 mg tablet Take 325 mg by mouth daily. ergocalciferol (VITAMIN D2) 50,000 unit capsule Take 50,000 Units by mouth daily. metoprolol (LOPRESSOR) 25 mg tablet Take 1 Tab by mouth every twelve (12) hours. Qty: 30 Tab, Refills: 0      pravastatin (PRAVACHOL) 40 mg tablet Take 1 Tab by mouth nightly. Qty: 30 Tab, Refills: 0           Activity: No driving while on analgesics and No heavy lifting for 6 weeks  Diet: Regular Diet  Wound Care: Keep wound clean and dry    Follow-up with Dr. Branden Hinojosa in 1 week.   Follow-up tests/labs none    Signed:  Tree Connor MD  [id number] [facility]  9/4/2018  8:30 AM

## 2018-09-04 NOTE — ROUTINE PROCESS
Surgical follow up appointment scheduled with Dr. Audrey Romero on 9/13/18 at 1:15PM.  Apt added to AVS

## 2018-09-04 NOTE — DISCHARGE INSTRUCTIONS
Abdominal Hernia Repair: What to Expect at Home  Your Recovery  After surgery to repair your hernia, you are likely to have pain for a few days. You may also feel like you have the flu, and you may have a low fever and feel tired and nauseated. This is common. You should feel better after a few days and will probably feel much better in 7 days. For several weeks you may feel twinges or pulling in the hernia repair when you move. You may have some bruising around the area of your hernia repair. This is normal.  This care sheet gives you a general idea about how long it will take for you to recover. But each person recovers at a different pace. Follow the steps below to get better as quickly as possible. How can you care for yourself at home? Activity    · Rest when you feel tired. Getting enough sleep will help you recover.     · Try to walk each day. Start by walking a little more than you did the day before. Bit by bit, increase the amount you walk. Walking boosts blood flow and helps prevent pneumonia and constipation.     · If your doctor gives you an abdominal binder to wear, use it as directed. This is an elastic bandage that wraps around your belly and upper hips. It helps support your belly muscles after surgery.     · Avoid strenuous activities, such as biking, jogging, weight lifting, or aerobic exercise, until your doctor says it is okay.     · Avoid lifting anything that would make you strain. This may include heavy grocery bags and milk containers, a heavy briefcase or backpack, cat litter or dog food bags, a vacuum , or a child.     · Ask your doctor when you can drive again.     · Most people are able to return to work within 1 to 2 weeks after surgery. But if your job requires that you to do heavy lifting or strenuous activity, you may need to take 4 to 6 weeks off from work.     · You may shower 24 to 48 hours after surgery, if your doctor okays it. Pat the cut (incision) dry.  Do not take a bath for the first 2 weeks, or until your doctor tells you it is okay.     · Ask your doctor when it is okay for you to have sex. Diet    · You can eat your normal diet. If your stomach is upset, try bland, low-fat foods like plain rice, broiled chicken, toast, and yogurt.     · Drink plenty of fluids (unless your doctor tells you not to).     · You may notice that your bowel movements are not regular right after your surgery. This is common. Avoid constipation and straining with bowel movements. You may want to take a fiber supplement every day. If you have not had a bowel movement after a couple of days, ask your doctor about taking a mild laxative. Medicines    · Your doctor will tell you if and when you can restart your medicines. He or she will also give you instructions about taking any new medicines.     · If you take blood thinners, such as warfarin (Coumadin), clopidogrel (Plavix), or aspirin, be sure to talk to your doctor. He or she will tell you if and when to start taking those medicines again. Make sure that you understand exactly what your doctor wants you to do.     · Be safe with medicines. Take pain medicines exactly as directed. ¨ If the doctor gave you a prescription medicine for pain, take it as prescribed. ¨ If you are not taking a prescription pain medicine, ask your doctor if you can take an over-the-counter medicine.     · If your doctor prescribed antibiotics, take them as directed. Do not stop taking them just because you feel better. You need to take the full course of antibiotics.     · If you think your pain medicine is making you sick to your stomach:  ¨ Take your medicine after meals (unless your doctor has told you not to). ¨ Ask your doctor for a different pain medicine. Incision care    · If you have strips of tape on the cut (incision) the doctor made, leave the tape on for a week or until it falls off. Or follow your doctor's instructions for removing the tape.   · If you have staples closing the cut, you will need to visit your doctor in 1 to 2 weeks to have them removed.     · Wash the area daily with warm, soapy water, and pat it dry. Don't use hydrogen peroxide or alcohol, which can slow healing. You may cover the area with a gauze bandage if it weeps or rubs against clothing. Change the bandage every day. Other instructions    · Hold a pillow over your incision when you cough or take deep breaths. This will support your belly and decrease your pain.     · Do breathing exercises at home as instructed by your doctor. This will help prevent pneumonia.     · If you had laparoscopic surgery, you may also have pain in your left shoulder. The pain usually lasts about a day or two. Follow-up care is a key part of your treatment and safety. Be sure to make and go to all appointments, and call your doctor if you are having problems. It's also a good idea to know your test results and keep a list of the medicines you take. When should you call for help? Call 911 anytime you think you may need emergency care. For example, call if:    · You passed out (lost consciousness).     · You are short of breath.    Call your doctor now or seek immediate medical care if:    · You are sick to your stomach and cannot drink fluids.     · You have signs of a blood clot in your leg (called a deep vein thrombosis), such as:  ¨ Pain in your calf, back of the knee, thigh, or groin. ¨ Redness and swelling in your leg or groin.     · You have signs of infection, such as:  ¨ Increased pain, swelling, warmth, or redness. ¨ Red streaks leading from the incision. ¨ Pus draining from the incision.   ¨ A fever.     · You cannot pass stools or gas.     · You have pain that does not get better after you take pain medicine.     · You have loose stitches, or your incision comes open.     · Bright red blood has soaked through the bandage over your incision.    Watch closely for changes in your health, and be sure to contact your doctor if you have any problems. Where can you learn more? Go to http://giovanny-cesar.info/. Enter B577 in the search box to learn more about \"Abdominal Hernia Repair: What to Expect at Home. \"  Current as of: May 12, 2017  Content Version: 11.7  © 7001-2781 [employer]. Care instructions adapted under license by [employer] (which disclaims liability or warranty for this information). If you have questions about a medical condition or this instruction, always ask your healthcare professional. [address] any warranty or liability for your use of this information.

## 2018-09-04 NOTE — PROGRESS NOTES
CM completed room visit on today. Pt aware that she will be d/c on today and transported home, via family. Pt will have follow up appointment with MD.  Pt aware that her nurse will review her d/c plans and needs. CM has completed the needs of the pt at this time. UPDATE: 12:20pm 
 
CM informed that pt is in need of transport home. INDIRA has scheduled a one way trip to pt's home, provided by [employer]. IDRIS Jewell  
[phone]

## 2018-09-13 ENCOUNTER — OFFICE VISIT (OUTPATIENT)
Dept: SURGERY | Age: 68
End: 2018-09-13

## 2018-09-13 VITALS
TEMPERATURE: 98.5 F | BODY MASS INDEX: 35.97 KG/M2 | WEIGHT: 223.8 LBS | RESPIRATION RATE: 16 BRPM | DIASTOLIC BLOOD PRESSURE: 88 MMHG | HEART RATE: 75 BPM | HEIGHT: 66 IN | OXYGEN SATURATION: 97 % | SYSTOLIC BLOOD PRESSURE: 132 MMHG

## 2018-09-13 DIAGNOSIS — Z09 POSTOPERATIVE EXAMINATION: Primary | ICD-10-CM

## 2018-09-13 RX ORDER — METOPROLOL TARTRATE 50 MG/1
TABLET ORAL
Refills: 0 | COMMUNITY
Start: 2018-09-04

## 2018-09-13 NOTE — PROGRESS NOTES
SUBJECTIVE: Argelia Monae is a 74 y.o. female is seen for a routine postop check following ventral hernia repair which was causing SBO. Reports no problems with the wound or other issues. Activity, dietare normal. No pain. Some constipation OBJECTIVE: Appears well. Wounds are well healed without complications or infection. 1/2 of clips removed ASSESSMENT: normal postoperative course, doing well. PLAN: Patient is instructed to avoid heavy lifting for 2 more weeks. Miralax daily for constipation. F/U 2 weeks to remove remaining staples

## 2018-09-27 ENCOUNTER — OFFICE VISIT (OUTPATIENT)
Dept: SURGERY | Age: 68
End: 2018-09-27

## 2018-09-27 VITALS
HEART RATE: 84 BPM | SYSTOLIC BLOOD PRESSURE: 133 MMHG | OXYGEN SATURATION: 95 % | WEIGHT: 223.3 LBS | RESPIRATION RATE: 15 BRPM | BODY MASS INDEX: 35.89 KG/M2 | TEMPERATURE: 98.3 F | DIASTOLIC BLOOD PRESSURE: 90 MMHG | HEIGHT: 66 IN

## 2018-09-27 DIAGNOSIS — Z09 POSTOPERATIVE EXAMINATION: Primary | ICD-10-CM

## 2018-09-27 NOTE — MR AVS SNAPSHOT
[address] [place] [address] 
[phone] Patient: Rogelio Castellanos MRN: [id number] :1950 Visit Information Date & Time Provider Department Dept. Phone Encounter #  
 2018  1:00 PM Nathan Alexander MD [facility] [place] [id number] Follow-up Instructions Return if symptoms worsen or fail to improve. Follow-up and Disposition History Upcoming Health Maintenance Date Due Hepatitis C Screening 1950 DTaP/Tdap/Td series (1 - Tdap) 1973 Shingrix Vaccine Age 50> (1 of 2) 2002 FOBT Q 1 YEAR AGE 50-75 2002 BREAST CANCER SCRN MAMMOGRAM 2013 GLAUCOMA SCREENING Q2Y 2017 Bone Densitometry (Dexa) Screening 2017 Pneumococcal 65+ Low/Medium Risk (1 of 2 - PCV13) 2017 Influenza Age 5 to Adult 2018 MEDICARE YEARLY EXAM 2018 Allergies as of 2018  Review Complete On: 2018 By: Kashif Glynn LPN No Known Allergies Current Immunizations  Reviewed on 2011 No immunizations on file. Not reviewed this visit You Were Diagnosed With   
  
 Codes Comments Postoperative examination    -  Primary ICD-10-CM: H46 ICD-9-CM: V67.00 Vitals BP Pulse Temp Resp Height(growth percentile) Weight(growth percentile) 133/90 (BP 1 Location: Left arm, BP Patient Position: Sitting) 84 98.3 °F (36.8 °C) (Oral) 15 5' 6\" (1.676 m) 223 lb 4.8 oz (101.3 kg) SpO2 BMI OB Status Smoking Status 95% 36.04 kg/m2 Postmenopausal Former Smoker BMI and BSA Data Body Mass Index Body Surface Area 36.04 kg/m 2 2.17 m 2 Preferred Pharmacy Pharmacy Name Phone [phone], [address] AT [place] [address] [phone] Your Updated Medication List  
  
   
 This list is accurate as of 9/27/18  2:16 PM.  Always use your most recent med list.  
  
  
  
  
 aspirin 325 mg tablet Commonly known as:  ASPIRIN Take 325 mg by mouth daily. HYDROcodone-acetaminophen 5-325 mg per tablet Commonly known as:  Barnet Cuff Take 1-2 Tabs by mouth every four (4) hours as needed. Max Daily Amount: 12 Tabs. * metoprolol tartrate 25 mg tablet Commonly known as:  LOPRESSOR Take 1 Tab by mouth every twelve (12) hours. * metoprolol tartrate 50 mg tablet Commonly known as:  LOPRESSOR TK 1 T PO BID  
  
 pravastatin 40 mg tablet Commonly known as:  PRAVACHOL Take 1 Tab by mouth nightly. VITAMIN D2 50,000 unit capsule Generic drug:  ergocalciferol Take 50,000 Units by mouth daily. * Notice: This list has 2 medication(s) that are the same as other medications prescribed for you. Read the directions carefully, and ask your doctor or other care provider to review them with you. Follow-up Instructions Return if symptoms worsen or fail to improve. Introducing [facility] SERVICES! Leny Pepper introduces [employer] patient portal. Now you can access parts of your medical record, email your doctor's office, and request medication refills online. 1. In your internet browser, go to [email or web address] 2. Click on the First Time User? Click Here link in the Sign In box. You will see the New Member Sign Up page. 3. Enter your [employer] Access Code exactly as it appears below. You will not need to use this code after youve completed the sign-up process. If you do not sign up before the expiration date, you must request a new code. · [employer] Access Code: [email or web address] Expires: 11/28/2018 12:47 AM 
 
4. Enter the last four digits of your Social Security Number (xxxx) and Date of Birth (mm/dd/yyyy) as indicated and click Submit. You will be taken to the next sign-up page. 5. Create a [employer] ID. This will be your [employer] login ID and cannot be changed, so think of one that is secure and easy to remember. 6. Create a [employer] password. You can change your password at any time. 7. Enter your Password Reset Question and Answer. This can be used at a later time if you forget your password. 8. Enter your e-mail address. You will receive e-mail notification when new information is available in [address] 9. Click Sign Up. You can now view and download portions of your medical record. 10. Click the Download Summary menu link to download a portable copy of your medical information. If you have questions, please visit the Frequently Asked Questions section of the [employer] website. Remember, [employer] is NOT to be used for urgent needs. For medical emergencies, dial 911. Now available from your iPhone and Android! Please provide this summary of care documentation to your next provider. Your primary care clinician is listed as NONE. If you have any questions after today's visit, please call [phone].

## 2018-09-27 NOTE — PROGRESS NOTES
SUBJECTIVE: Lance Garcia is a 74 y.o. female is seen for a routine postop check following a ventral hernia repair. Reports no problems with the wound or other issues. Activity, diet and bowels are normal. No pain. OBJECTIVE: Appears well. Wounds are well healed without complications or infection. ASSESSMENT: normal postoperative course, doing well. PLAN: Patient is instructed to avoid heavy lifting for 2 more weeks. Return PRN for any problems or concerns.

## 2018-09-27 NOTE — PROGRESS NOTES
Chief Complaint   Patient presents with    Surgical Follow-up     Ventral Hernia 9/2/18     1. Have you been to the ER, urgent care clinic since your last visit? Hospitalized since your last visit? No    2. Have you seen or consulted any other health care providers outside of the [address] [place] [address] since your last visit? Include any pap smears or colon screening. No     Discussed advanced directive. Patient states that she does not have an advanced directive.

## 2023-06-08 NOTE — MR AVS SNAPSHOT
[address] [facility] 
[phone] Patient: Mikal Silva MRN: [id number] :1950 Visit Information Date & Time Provider Department Dept. Phone Encounter #  
 2018  1:45 PM Bhupendra Thomas MD [facility] [place] [id number] Follow-up Instructions Return if symptoms worsen or fail to improve. Follow-up and Disposition History Your Appointments 2018  1:00 PM  
POST OP with Bhupendra Thomas MD  
[facility] ([facility] Appt Note: remove staples  surg repair of ventral Hernia Surg   
 [address] [place] [address] [address] [phone]  
[address] [address] Upcoming Health Maintenance Date Due Hepatitis C Screening 1950 DTaP/Tdap/Td series (1 - Tdap) 1973 FOBT Q 1 YEAR AGE 50-75 2002 ZOSTER VACCINE AGE 60> 10/28/2012 BREAST CANCER SCRN MAMMOGRAM 2013 GLAUCOMA SCREENING Q2Y 2017 Bone Densitometry (Dexa) Screening 2017 Pneumococcal 65+ Low/Medium Risk (1 of 2 - PCV13) 2017 Influenza Age 5 to Adult 2018 MEDICARE YEARLY EXAM 2018 Allergies as of 2018  Review Complete On: 2018 By: Robbie Fields LPN No Known Allergies Current Immunizations  Reviewed on 2011 No immunizations on file. Not reviewed this visit You Were Diagnosed With   
  
 Codes Comments Postoperative examination    -  Primary ICD-10-CM: N63 ICD-9-CM: V67.00 Vitals BP Pulse Temp Resp Height(growth percentile) Weight(growth percentile) 132/88 (BP 1 Location: Right arm, BP Patient Position: Sitting) 75 98.5 °F (36.9 °C) (Oral) 16 5' 6\" (1.676 m) 223 lb 12.8 oz (101.5 kg) SpO2 BMI OB Status Smoking Status 97% 36.12 kg/m2 Postmenopausal Former Smoker Vitals History BMI and BSA Data Body Mass Index Body Surface Area  
 36.12 kg/m 2 2.17 m 2 Preferred Pharmacy Pharmacy Name Phone [facility] [phone] - [phone], [address] AT [place] [address] [phone] Your Updated Medication List  
  
   
This list is accurate as of 9/13/18  1:51 PM.  Always use your most recent med list.  
  
  
  
  
 aspirin 325 mg tablet Commonly known as:  ASPIRIN Take 325 mg by mouth daily. HYDROcodone-acetaminophen 5-325 mg per tablet Commonly known as:  Irving Ting Take 1-2 Tabs by mouth every four (4) hours as needed. Max Daily Amount: 12 Tabs. * metoprolol tartrate 25 mg tablet Commonly known as:  LOPRESSOR Take 1 Tab by mouth every twelve (12) hours. * metoprolol tartrate 50 mg tablet Commonly known as:  LOPRESSOR TK 1 T PO BID  
  
 pravastatin 40 mg tablet Commonly known as:  PRAVACHOL Take 1 Tab by mouth nightly. VITAMIN D2 50,000 unit capsule Generic drug:  ergocalciferol Take 50,000 Units by mouth daily. * Notice: This list has 2 medication(s) that are the same as other medications prescribed for you. Read the directions carefully, and ask your doctor or other care provider to review them with you. Follow-up Instructions Return if symptoms worsen or fail to improve. Introducing [facility] HEALTH SERVICES! [employer] introduces [employer] patient portal. Now you can access parts of your medical record, email your doctor's office, and request medication refills online. 1. In your internet browser, go to [email or web address] 2. Click on the First Time User? Click Here link in the Sign In box. You will see the New Member Sign Up page. 3. Enter your [employer] Access Code exactly as it appears below.  You will not need to use this code after youve completed the sign-up process. If you do not sign up before the expiration date, you must request a new code. · [employer] Access Code: [email or web address] Expires: 11/28/2018 12:47 AM 
 
4. Enter the last four digits of your Social Security Number (xxxx) and Date of Birth (mm/dd/yyyy) as indicated and click Submit. You will be taken to the next sign-up page. 5. Create a [employer] ID. This will be your [employer] login ID and cannot be changed, so think of one that is secure and easy to remember. 6. Create a [employer] password. You can change your password at any time. 7. Enter your Password Reset Question and Answer. This can be used at a later time if you forget your password. 8. Enter your e-mail address. You will receive e-mail notification when new information is available in [address] 9. Click Sign Up. You can now view and download portions of your medical record. 10. Click the Download Summary menu link to download a portable copy of your medical information. If you have questions, please visit the Frequently Asked Questions section of the [employer] website. Remember, [employer] is NOT to be used for urgent needs. For medical emergencies, dial 911. Now available from your iPhone and Android! Please provide this summary of care documentation to your next provider. Your primary care clinician is listed as NONE. If you have any questions after today's visit, please call [phone]. Dupixent Pregnancy And Lactation Text: This medication likely crosses the placenta but the risk for the fetus is uncertain. This medication is excreted in breast milk.

## (undated) DEVICE — DEVON™ KNEE AND BODY STRAP 60" X 3" (1.5 M X 7.6 CM): Brand: DEVON

## (undated) DEVICE — (D)STRIP SKN CLSR 0.5X4IN WHT --

## (undated) DEVICE — INFECTION CONTROL KIT SYS

## (undated) DEVICE — REM POLYHESIVE ADULT PATIENT RETURN ELECTRODE: Brand: VALLEYLAB

## (undated) DEVICE — PREP SKN PREVAIL 40ML APPL --

## (undated) DEVICE — KENDALL SCD EXPRESS SLEEVES, KNEE LENGTH, MEDIUM: Brand: KENDALL SCD

## (undated) DEVICE — SHEET, T, LAPAROTOMY, STERILE: Brand: MEDLINE

## (undated) DEVICE — TOWEL SURG W17XL27IN STD BLU COT NONFENESTRATED PREWASHED

## (undated) DEVICE — HANDLE LT SNAP ON ULT DURABLE LENS FOR TRUMPF ALC DISPOSABLE

## (undated) DEVICE — SOLUTION IV 1000ML 0.9% SOD CHL

## (undated) DEVICE — STERILE POLYISOPRENE POWDER-FREE SURGICAL GLOVES: Brand: PROTEXIS

## (undated) DEVICE — SUT PROL 0 30IN CT1 BLU --

## (undated) DEVICE — NEEDLE HYPO 22GA L1.5IN BLK S STL HUB POLYPR SHLD REG BVL

## (undated) DEVICE — 1200 GUARD II KIT W/5MM TUBE W/O VAC TUBE: Brand: GUARDIAN

## (undated) DEVICE — SUTURE MCRYL SZ 4-0 L27IN ABSRB UD L19MM PS-2 1/2 CIR PRIM Y426H

## (undated) DEVICE — Device